# Patient Record
Sex: FEMALE | Race: WHITE | NOT HISPANIC OR LATINO | Employment: FULL TIME | URBAN - METROPOLITAN AREA
[De-identification: names, ages, dates, MRNs, and addresses within clinical notes are randomized per-mention and may not be internally consistent; named-entity substitution may affect disease eponyms.]

---

## 2017-10-06 ENCOUNTER — OFFICE VISIT (OUTPATIENT)
Dept: URGENT CARE | Facility: CLINIC | Age: 42
End: 2017-10-06
Payer: COMMERCIAL

## 2017-10-06 VITALS
DIASTOLIC BLOOD PRESSURE: 70 MMHG | TEMPERATURE: 99.5 F | WEIGHT: 182 LBS | HEIGHT: 63 IN | SYSTOLIC BLOOD PRESSURE: 118 MMHG | OXYGEN SATURATION: 100 % | BODY MASS INDEX: 32.25 KG/M2 | HEART RATE: 77 BPM

## 2017-10-06 DIAGNOSIS — L30.8 OTHER ECZEMA: ICD-10-CM

## 2017-10-06 DIAGNOSIS — J01.00 ACUTE NON-RECURRENT MAXILLARY SINUSITIS: ICD-10-CM

## 2017-10-06 PROCEDURE — 99203 OFFICE O/P NEW LOW 30 MIN: CPT | Performed by: PHYSICIAN ASSISTANT

## 2017-10-06 RX ORDER — AMOXICILLIN AND CLAVULANATE POTASSIUM 875; 125 MG/1; MG/1
1 TABLET, FILM COATED ORAL 2 TIMES DAILY
Qty: 14 TAB | Refills: 0 | Status: SHIPPED | OUTPATIENT
Start: 2017-10-06 | End: 2017-10-13

## 2017-10-06 NOTE — LETTER
October 6, 2017         Patient: Cindy Yung   YOB: 1975   Date of Visit: 10/6/2017           To Whom it May Concern:    Cindy Yung was seen in my clinic on 10/6/2017. Please excuse her absence today, 10/6/17.    If you have any questions or concerns, please don't hesitate to call.        Sincerely,           Opal Pollard P.A.-C.  Electronically Signed

## 2017-10-07 ENCOUNTER — TELEPHONE (OUTPATIENT)
Dept: URGENT CARE | Facility: CLINIC | Age: 42
End: 2017-10-07

## 2017-10-07 DIAGNOSIS — L30.9 ECZEMA, UNSPECIFIED TYPE: ICD-10-CM

## 2017-10-07 RX ORDER — PIMECROLIMUS 10 MG/G
1 CREAM TOPICAL 2 TIMES DAILY
Qty: 30 G | Refills: 1 | Status: SHIPPED | OUTPATIENT
Start: 2017-10-07 | End: 2021-07-26

## 2017-10-07 NOTE — TELEPHONE ENCOUNTER
Pt called; seen here for eczema rash yest; didn't get med at pharmacy; wants a non steroid cream; will send elidel rx; [Urg Care Provider not here today and pt doesn't want to wait on med]. rw

## 2017-10-07 NOTE — PROGRESS NOTES
Subjective:      Cindy Yung is a 42 y.o. female who presents with Rash (X 3 weeks, all over parts of body, hx eczyma ) and Sinus Problem (X 1 day, headache, Ear pressure, lathargic )        Patient is accompanied by her .     Rash   This is a recurrent problem. The current episode started 1 to 4 weeks ago (3 weeks). The problem has been gradually worsening since onset. The affected locations include the chest, left arm and right arm. The rash is characterized by dryness, itchiness and redness. She was exposed to nothing. Pertinent negatives include no cough, diarrhea, fever, joint pain, nail changes, shortness of breath, sore throat or vomiting. Past treatments include topical steroids (OTC hydrocortisone). The treatment provided no relief. Her past medical history is significant for eczema. There is no history of allergies or asthma.   Sinus Problem   Pertinent negatives include no chills, coughing, ear pain, shortness of breath or sore throat.     Patient presents to urgent care reporting sinus pressure and congestion with ear pressure and fatigue x 1 week. She reports a history of infrequent sinus infections. She also reports a flare of eczema x 3 weeks that she believes is due to increased stress. She states her flares are usually manageable with OTC hydrocortisone cream, but this episode is not responding well.     Review of Systems   Constitutional: Negative for chills and fever.   HENT: Negative for ear pain and sore throat.         Positive for sinus pressure   Respiratory: Negative for cough, sputum production and shortness of breath.    Cardiovascular: Negative for chest pain.   Gastrointestinal: Negative for abdominal pain, diarrhea, nausea and vomiting.   Genitourinary: Negative.    Musculoskeletal: Negative.  Negative for joint pain.   Skin: Positive for itching and rash. Negative for nail changes.   Neurological: Negative for dizziness.      Objective:     /70   Pulse 77   Temp 37.5  "°C (99.5 °F)   Ht 1.6 m (5' 3\")   Wt 82.6 kg (182 lb)   SpO2 100%   BMI 32.24 kg/m²      Physical Exam   Constitutional: She is oriented to person, place, and time. She appears well-developed and well-nourished. No distress.   HENT:   Head: Normocephalic and atraumatic.   Right Ear: Hearing, tympanic membrane, external ear and ear canal normal.   Left Ear: Hearing, tympanic membrane, external ear and ear canal normal.   Nose: Mucosal edema present. Right sinus exhibits maxillary sinus tenderness. Left sinus exhibits maxillary sinus tenderness.   Mouth/Throat: Oropharynx is clear and moist. No oropharyngeal exudate.   Eyes: Conjunctivae are normal. Pupils are equal, round, and reactive to light. Right eye exhibits no discharge. Left eye exhibits no discharge.   Neck: Normal range of motion.   Cardiovascular: Normal rate, regular rhythm and normal heart sounds.    No murmur heard.  Pulmonary/Chest: Effort normal and breath sounds normal. No respiratory distress. She has no wheezes. She has no rales.   Musculoskeletal: Normal range of motion.   Lymphadenopathy:     She has no cervical adenopathy.   Neurological: She is alert and oriented to person, place, and time.   Skin: Skin is warm and dry. She is not diaphoretic.   Diffuse raised and erythematous areas on chest and bilateral forearms   Psychiatric: She has a normal mood and affect. Her behavior is normal.   Nursing note and vitals reviewed.       PMH:  has a past medical history of Depression.  MEDS:   Current Outpatient Prescriptions:   •  triamcinolone acetonide (KENALOG) 0.1 % Cream, Apply thin film to affected areas twice daily until symptoms resolve, Disp: 1 Tube, Rfl: 0  •  Hydrocortisone-Aloe Vera (CORTIZONE-10/ALOE) 1 % Cream, by Apply externally route., Disp: , Rfl:   •  amoxicillin-clavulanate (AUGMENTIN) 875-125 MG Tab, Take 1 Tab by mouth 2 times a day for 7 days., Disp: 14 Tab, Rfl: 0  •  pimecrolimus (ELIDEL) 1 % cream, Apply 1 Application to " affected area(s) 2 times a day., Disp: 30 g, Rfl: 1  •  omeprazole (PRILOSEC) 20 MG CPDR, Take 1 Cap by mouth every day., Disp: 30 Cap, Rfl: 11  •  buPROPion (WELLBUTRIN) 150 MG XL tablet, Take 1 Tab by mouth every morning., Disp: 30 Tab, Rfl: 0  •  fluoxetine (PROZAC) 10 MG CAPS, Take 1 Cap by mouth every day., Disp: 21 Each, Rfl: 0  •  alprazolam (XANAX) 0.5 MG TABS, Take 1 Tab by mouth 3 times a day as needed for Sleep and Anxiety., Disp: 30 Each, Rfl: 1  •  fluoxetine (PROZAC) 20 MG tablet, Take 1 Tab by mouth every day., Disp: 30 Tab, Rfl: 3  •  zolpidem (AMBIEN) 10 MG TABS, Take 1 Tab by mouth at bedtime as needed for Sleep., Disp: 30 Each, Rfl: 1  •  ciprofloxacin (CIPRO) 500 MG TABS, Take 1 Tab by mouth 2 times a day., Disp: 20 Each, Rfl: 0  •  buPROPion (WELLBUTRIN) 300 MG XL tablet, Take 1 Tab by mouth every morning., Disp: 30 Tab, Rfl: 3  •  betamethasone valerate (VALISONE) 0.1 % CREA, Apply  to affected area(s) 2 times a day. Use for 10 days, then break for one week, Disp: 45 g, Rfl: 1  •  montelukast (SINGULAIR) 10 MG TABS, Take 1 Tab by mouth every day., Disp: 30 Each, Rfl: 11  •  mometasone (NASONEX) 50 MCG/ACT nasal spray, Spray 2 Act in nose every day. Each nostril, Disp: 1 Inhaler, Rfl: 6  ALLERGIES: Not on File  SURGHX:   Past Surgical History:   Procedure Laterality Date   • ABDOMINAL HYSTERECTOMY TOTAL     • TUMOR EXCISION WITH BIOPSY      stomach - benign     SOCHX:  reports that she has never smoked. She has never used smokeless tobacco. She reports that she drinks alcohol.  FH: family history includes Diabetes in her father; Hypertension in her father; Psychiatry in her sister; Thyroid in her sister.       Assessment/Plan:     1. Other eczema  Trimacinolone cream 0.1 % - apply thin film to affected area 2 times daily until resolved. Discussed applying topical unscented moisturizer 2 times daily and avoiding any possible triggers. The patient demonstrated a good understanding and agreed with  the treatment plan.    2. Acute non-recurrent maxillary sinusitis  - amoxicillin-clavulanate (AUGMENTIN) 875-125 MG Tab; Take 1 Tab by mouth 2 times a day for 7 days.  Dispense: 14 Tab; Refill: 0   - Complete full course of antibiotics as prescribed      Advised patient her symptoms are most likely viral in etiology, recommend supportive care. Increased fluids and rest. Discussed use of nedi-pot, humidifier, and Flonase nasal spray for symptomatic relief. Contingent abx given if symptoms fail to improve or resolve after 3-4 days of conservative therapy. The patient demonstrated a good understanding and agreed with the treatment plan.

## 2017-10-09 RX ORDER — TRIAMCINOLONE ACETONIDE 1 MG/G
CREAM TOPICAL
Qty: 1 TUBE | Refills: 0 | Status: SHIPPED | OUTPATIENT
Start: 2017-10-09 | End: 2021-07-26

## 2017-10-09 ASSESSMENT — ENCOUNTER SYMPTOMS
DIARRHEA: 0
SORE THROAT: 0
MUSCULOSKELETAL NEGATIVE: 1
COUGH: 0
ABDOMINAL PAIN: 0
CHILLS: 0
SPUTUM PRODUCTION: 0
VOMITING: 0
FEVER: 0
SHORTNESS OF BREATH: 0
NAIL CHANGES: 0
NAUSEA: 0
DIZZINESS: 0

## 2017-12-20 ENCOUNTER — OFFICE VISIT (OUTPATIENT)
Dept: URGENT CARE | Facility: CLINIC | Age: 42
End: 2017-12-20
Payer: COMMERCIAL

## 2017-12-20 VITALS
TEMPERATURE: 98.4 F | DIASTOLIC BLOOD PRESSURE: 82 MMHG | HEART RATE: 72 BPM | OXYGEN SATURATION: 98 % | HEIGHT: 63 IN | SYSTOLIC BLOOD PRESSURE: 122 MMHG | BODY MASS INDEX: 29.77 KG/M2 | WEIGHT: 168 LBS | RESPIRATION RATE: 18 BRPM

## 2017-12-20 DIAGNOSIS — L30.9 ECZEMA, UNSPECIFIED TYPE: ICD-10-CM

## 2017-12-20 PROCEDURE — 99213 OFFICE O/P EST LOW 20 MIN: CPT | Performed by: PHYSICIAN ASSISTANT

## 2017-12-20 RX ORDER — DEXAMETHASONE SODIUM PHOSPHATE 4 MG/ML
8 INJECTION, SOLUTION INTRA-ARTICULAR; INTRALESIONAL; INTRAMUSCULAR; INTRAVENOUS; SOFT TISSUE ONCE
Status: COMPLETED | OUTPATIENT
Start: 2017-12-20 | End: 2017-12-20

## 2017-12-20 RX ORDER — METHYLPREDNISOLONE 4 MG/1
TABLET ORAL
Qty: 1 TAB | Refills: 4 | Status: SHIPPED | OUTPATIENT
Start: 2017-12-20 | End: 2021-07-26

## 2017-12-20 RX ADMIN — DEXAMETHASONE SODIUM PHOSPHATE 8 MG: 4 INJECTION, SOLUTION INTRA-ARTICULAR; INTRALESIONAL; INTRAMUSCULAR; INTRAVENOUS; SOFT TISSUE at 15:04

## 2017-12-20 ASSESSMENT — ENCOUNTER SYMPTOMS
NEUROLOGICAL NEGATIVE: 1
CONSTITUTIONAL NEGATIVE: 1
MUSCULOSKELETAL NEGATIVE: 1

## 2017-12-20 NOTE — LETTER
December 20, 2017         Patient: Cindy Yung   YOB: 1975   Date of Visit: 12/20/2017           To Whom it May Concern:    Cindy Yung was seen in my clinic on 12/20/2017; please excuse work for today.    If you have any questions or concerns, please don't hesitate to call.        Sincerely,           Shar Goodrich P.A.-C.  Electronically Signed

## 2017-12-29 ENCOUNTER — OFFICE VISIT (OUTPATIENT)
Dept: URGENT CARE | Facility: CLINIC | Age: 42
End: 2017-12-29
Payer: COMMERCIAL

## 2017-12-29 VITALS
HEIGHT: 63 IN | DIASTOLIC BLOOD PRESSURE: 84 MMHG | OXYGEN SATURATION: 97 % | TEMPERATURE: 97.9 F | HEART RATE: 80 BPM | BODY MASS INDEX: 29.06 KG/M2 | WEIGHT: 164 LBS | SYSTOLIC BLOOD PRESSURE: 120 MMHG

## 2017-12-29 DIAGNOSIS — L30.9 ECZEMA, UNSPECIFIED TYPE: ICD-10-CM

## 2017-12-29 PROCEDURE — 99214 OFFICE O/P EST MOD 30 MIN: CPT | Performed by: NURSE PRACTITIONER

## 2017-12-29 RX ORDER — CEPHALEXIN 500 MG/1
500 CAPSULE ORAL 2 TIMES DAILY
Qty: 14 CAP | Refills: 0 | Status: SHIPPED | OUTPATIENT
Start: 2017-12-29 | End: 2019-04-09

## 2017-12-29 RX ORDER — PREDNISONE 20 MG/1
TABLET ORAL
Qty: 30 TAB | Refills: 0 | Status: SHIPPED | OUTPATIENT
Start: 2017-12-29 | End: 2019-05-31

## 2017-12-29 ASSESSMENT — ENCOUNTER SYMPTOMS
CHILLS: 0
HEADACHES: 0
WEIGHT LOSS: 1
FEVER: 0
SORE THROAT: 1

## 2017-12-30 NOTE — PROGRESS NOTES
Subjective:      Cindy Yung is a 42 y.o. female who presents with Eczema (on hands,chest,stomach,back hurts/burns xlast night )            HPI New problem. 42 year old female presenting with rash to torso, arms and hands since last night. This has been an ongoing issue and she was last seen on 12/20, rx'd cream and medrol as well as decadron shot. She was doing well until last night when rash once again flared. It is red, itching/burning and blotchy. No new exposures. She uses all unscented products. Taking benadryl at night.  Patient has no known allergies.  Current Outpatient Prescriptions on File Prior to Visit   Medication Sig Dispense Refill   • MethylPREDNISolone (MEDROL DOSEPAK) 4 MG Tablet Therapy Pack U.D. 1 Tab 4   • triamcinolone acetonide (KENALOG) 0.1 % Cream Apply thin film to affected areas twice daily until symptoms resolve 1 Tube 0   • pimecrolimus (ELIDEL) 1 % cream Apply 1 Application to affected area(s) 2 times a day. 30 g 1   • Hydrocortisone-Aloe Vera (CORTIZONE-10/ALOE) 1 % Cream by Apply externally route.     • omeprazole (PRILOSEC) 20 MG CPDR Take 1 Cap by mouth every day. 30 Cap 11   • buPROPion (WELLBUTRIN) 150 MG XL tablet Take 1 Tab by mouth every morning. 30 Tab 0   • fluoxetine (PROZAC) 10 MG CAPS Take 1 Cap by mouth every day. 21 Each 0   • alprazolam (XANAX) 0.5 MG TABS Take 1 Tab by mouth 3 times a day as needed for Sleep and Anxiety. 30 Each 1   • fluoxetine (PROZAC) 20 MG tablet Take 1 Tab by mouth every day. 30 Tab 3   • zolpidem (AMBIEN) 10 MG TABS Take 1 Tab by mouth at bedtime as needed for Sleep. 30 Each 1   • ciprofloxacin (CIPRO) 500 MG TABS Take 1 Tab by mouth 2 times a day. 20 Each 0   • buPROPion (WELLBUTRIN) 300 MG XL tablet Take 1 Tab by mouth every morning. 30 Tab 3   • betamethasone valerate (VALISONE) 0.1 % CREA Apply  to affected area(s) 2 times a day. Use for 10 days, then break for one week 45 g 1   • montelukast (SINGULAIR) 10 MG TABS Take 1 Tab by mouth  "every day. 30 Each 11   • mometasone (NASONEX) 50 MCG/ACT nasal spray Spray 2 Act in nose every day. Each nostril 1 Inhaler 6     No current facility-administered medications on file prior to visit.      Social History     Social History   • Marital status:      Spouse name: N/A   • Number of children: N/A   • Years of education: N/A     Occupational History   • Not on file.     Social History Main Topics   • Smoking status: Never Smoker   • Smokeless tobacco: Never Used   • Alcohol use Yes      Comment: mod   • Drug use: Unknown   • Sexual activity: Not on file      Comment: ; three kids; wk: exec assist @ [employer]     Other Topics Concern   • Not on file     Social History Narrative   • No narrative on file     family history includes Diabetes in her father; Hypertension in her father; Psychiatry in her sister; Thyroid in her sister.      Review of Systems   Constitutional: Positive for weight loss. Negative for chills and fever.   HENT: Positive for sore throat.    Skin: Positive for itching and rash.   Neurological: Negative for headaches.          Objective:     /84   Pulse 80   Temp 36.6 °C (97.9 °F)   Ht 1.6 m (5' 3\")   Wt 74.4 kg (164 lb)   SpO2 97%   BMI 29.05 kg/m²      Physical Exam   Constitutional: She is oriented to person, place, and time. She appears well-developed and well-nourished. No distress.   Cardiovascular: Normal rate, regular rhythm and normal heart sounds.    No murmur heard.  Pulmonary/Chest: Effort normal and breath sounds normal.   Musculoskeletal: Normal range of motion.   Neurological: She is alert and oriented to person, place, and time.   Skin: Skin is warm and dry. Rash noted.   Mostly confluent red rash over arms and torso, raised. No discharge or excoriation noted on exam.   Psychiatric: She has a normal mood and affect. Her behavior is normal. Thought content normal.   Nursing note and vitals reviewed.              Assessment/Plan:     1. Eczema, " unspecified type    - fluocinonide (LIDEX) 0.05 % Cream; Apply to affected areas twice daily for 5 days, take 2 days and then repeat.  Dispense: 1 Tube; Refill: 1  - predniSONE (DELTASONE) 20 MG Tab; Take 1 tab daily for 3 days.  Dispense: 30 Tab; Refill: 0  - cephALEXin (KEFLEX) 500 MG Cap; Take 1 Cap by mouth 2 times a day.  Dispense: 14 Cap; Refill: 0  - REFERRAL TO ALLERGY    Reiterated importance of moisturizing.  Allergy referral  She has derm referral in place.  Differential diagnosis, natural history, supportive care, and indications for immediate follow-up discussed at length.

## 2018-01-11 ENCOUNTER — PATIENT MESSAGE (OUTPATIENT)
Dept: URGENT CARE | Facility: CLINIC | Age: 43
End: 2018-01-11

## 2019-03-26 ENCOUNTER — OFFICE VISIT (OUTPATIENT)
Dept: URGENT CARE | Facility: CLINIC | Age: 44
End: 2019-03-26
Payer: COMMERCIAL

## 2019-03-26 ENCOUNTER — TELEPHONE (OUTPATIENT)
Dept: SCHEDULING | Facility: IMAGING CENTER | Age: 44
End: 2019-03-26

## 2019-03-26 VITALS
DIASTOLIC BLOOD PRESSURE: 60 MMHG | HEIGHT: 63 IN | OXYGEN SATURATION: 98 % | SYSTOLIC BLOOD PRESSURE: 130 MMHG | TEMPERATURE: 97.9 F | BODY MASS INDEX: 26.05 KG/M2 | RESPIRATION RATE: 16 BRPM | HEART RATE: 60 BPM | WEIGHT: 147 LBS

## 2019-03-26 DIAGNOSIS — F32.A ANXIETY AND DEPRESSION: ICD-10-CM

## 2019-03-26 DIAGNOSIS — F41.9 ANXIETY AND DEPRESSION: ICD-10-CM

## 2019-03-26 PROCEDURE — 99214 OFFICE O/P EST MOD 30 MIN: CPT | Performed by: PHYSICIAN ASSISTANT

## 2019-03-26 ASSESSMENT — ENCOUNTER SYMPTOMS
FATIGUE: 0
GASTROINTESTINAL NEGATIVE: 1
ROS GI COMMENTS: ANOREXIA
CONSTITUTIONAL NEGATIVE: 1
NERVOUS/ANXIOUS: 0
INSOMNIA: 0
RESPIRATORY NEGATIVE: 1
HEADACHES: 0
DEPRESSION: 1
CARDIOVASCULAR NEGATIVE: 1
NEUROLOGICAL NEGATIVE: 1
CHILLS: 0
EYES NEGATIVE: 1
FEVER: 0

## 2019-03-26 ASSESSMENT — LIFESTYLE VARIABLES: SUBSTANCE_ABUSE: 0

## 2019-03-26 NOTE — PROGRESS NOTES
Subjective:      Cindy Yung is a 43 y.o. female who presents with Other ([id number], emotionally not well)            Patient is here because she has been having a recurrence of her anxiety and depression for the last month.  She states she has not been emotionally coping well.  She has a long history of this problem and there is also a strong family history.  She was on medication for several years however has not been on meds for the last 3 years.  She did not have insurance for a long time and has not been to a mental health specialist in over 2 years.  She does have a strong support system at home with her  and at her local [facility].  They do offer counseling services for free.  She is here because she is requesting referral to specialty health for medication and counseling services.  She denies any drug or alcohol use or abuse.  She does admit to thoughts of harming herself however none recently and she has no plan associated with this.  She has no homicidal ideation.  Patient with decreased appetite.  Sleeping well.      Depression   This is a chronic problem. The current episode started more than 1 month ago. The problem occurs constantly. The problem has been waxing and waning. Pertinent negatives include no chest pain, chills, congestion, fatigue, fever or headaches. She has tried nothing for the symptoms. The treatment provided no relief.       PMH:  has a past medical history of Depression.  MEDS:   Current Outpatient Prescriptions:   •  betamethasone valerate (VALISONE) 0.1 % CREA, Apply  to affected area(s) 2 times a day. Use for 10 days, then break for one week, Disp: 45 g, Rfl: 1  •  fluocinonide (LIDEX) 0.05 % Cream, Apply to affected areas twice daily for 5 days, take 2 days and then repeat. (Patient not taking: Reported on 3/26/2019), Disp: 1 Tube, Rfl: 1  •  predniSONE (DELTASONE) 20 MG Tab, Take 1 tab daily for 3 days. (Patient not taking: Reported on 3/26/2019), Disp: 30 Tab, Rfl: 0  •   cephALEXin (KEFLEX) 500 MG Cap, Take 1 Cap by mouth 2 times a day. (Patient not taking: Reported on 3/26/2019), Disp: 14 Cap, Rfl: 0  •  MethylPREDNISolone (MEDROL DOSEPAK) 4 MG Tablet Therapy Pack, U.D. (Patient not taking: Reported on 3/26/2019), Disp: 1 Tab, Rfl: 4  •  triamcinolone acetonide (KENALOG) 0.1 % Cream, Apply thin film to affected areas twice daily until symptoms resolve (Patient not taking: Reported on 3/26/2019), Disp: 1 Tube, Rfl: 0  •  pimecrolimus (ELIDEL) 1 % cream, Apply 1 Application to affected area(s) 2 times a day. (Patient not taking: Reported on 3/26/2019), Disp: 30 g, Rfl: 1  •  Hydrocortisone-Aloe Vera (CORTIZONE-10/ALOE) 1 % Cream, by Apply externally route., Disp: , Rfl:   •  omeprazole (PRILOSEC) 20 MG CPDR, Take 1 Cap by mouth every day. (Patient not taking: Reported on 3/26/2019), Disp: 30 Cap, Rfl: 11  •  buPROPion (WELLBUTRIN) 150 MG XL tablet, Take 1 Tab by mouth every morning. (Patient not taking: Reported on 3/26/2019), Disp: 30 Tab, Rfl: 0  •  fluoxetine (PROZAC) 10 MG CAPS, Take 1 Cap by mouth every day. (Patient not taking: Reported on 3/26/2019), Disp: 21 Each, Rfl: 0  •  alprazolam (XANAX) 0.5 MG TABS, Take 1 Tab by mouth 3 times a day as needed for Sleep and Anxiety. (Patient not taking: Reported on 3/26/2019), Disp: 30 Each, Rfl: 1  •  fluoxetine (PROZAC) 20 MG tablet, Take 1 Tab by mouth every day. (Patient not taking: Reported on 3/26/2019), Disp: 30 Tab, Rfl: 3  •  zolpidem (AMBIEN) 10 MG TABS, Take 1 Tab by mouth at bedtime as needed for Sleep. (Patient not taking: Reported on 3/26/2019), Disp: 30 Each, Rfl: 1  •  ciprofloxacin (CIPRO) 500 MG TABS, Take 1 Tab by mouth 2 times a day. (Patient not taking: Reported on 3/26/2019), Disp: 20 Each, Rfl: 0  •  buPROPion (WELLBUTRIN) 300 MG XL tablet, Take 1 Tab by mouth every morning. (Patient not taking: Reported on 3/26/2019), Disp: 30 Tab, Rfl: 3  •  montelukast (SINGULAIR) 10 MG TABS, Take 1 Tab by mouth every day.  "(Patient not taking: Reported on 3/26/2019), Disp: 30 Each, Rfl: 11  •  mometasone (NASONEX) 50 MCG/ACT nasal spray, Spray 2 Act in nose every day. Each nostril (Patient not taking: Reported on 3/26/2019), Disp: 1 Inhaler, Rfl: 6  ALLERGIES: No Known Allergies  SURGHX:   Past Surgical History:   Procedure Laterality Date   • ABDOMINAL HYSTERECTOMY TOTAL     • TUMOR EXCISION WITH BIOPSY      stomach - benign     SOCHX:  reports that she has never smoked. She has never used smokeless tobacco. She reports that she drinks alcohol.  FH: family history includes Diabetes in her father; Hypertension in her father; Psychiatry in her sister; Thyroid in her sister.      Review of Systems   Constitutional: Negative.  Negative for chills, fatigue and fever.   HENT: Negative for congestion.    Eyes: Negative.    Respiratory: Negative.    Cardiovascular: Negative.  Negative for chest pain.   Gastrointestinal: Negative.         Anorexia   Neurological: Negative.  Negative for headaches.   Psychiatric/Behavioral: Positive for depression. Negative for substance abuse and suicidal ideas. The patient is not nervous/anxious and does not have insomnia.        Medications, Allergies, and current problem list reviewed today in Epic     Objective:     /60 (BP Location: Left arm, Patient Position: Sitting, BP Cuff Size: Adult)   Pulse 60   Temp 36.6 °C (97.9 °F) (Temporal)   Resp 16   Ht 1.6 m (5' 3\")   Wt 66.7 kg (147 lb)   SpO2 98%   BMI 26.04 kg/m²      Physical Exam   Constitutional: She is oriented to person, place, and time. She appears well-developed and well-nourished. No distress.   HENT:   Head: Normocephalic and atraumatic.   Right Ear: External ear normal.   Left Ear: External ear normal.   Eyes: Conjunctivae are normal.   Neck: Normal range of motion. Neck supple.   Cardiovascular: Normal rate, regular rhythm and normal heart sounds.    Pulmonary/Chest: Effort normal and breath sounds normal. No respiratory " distress. She has no wheezes. She has no rales.   Neurological: She is alert and oriented to person, place, and time.   Skin: Skin is warm and dry. She is not diaphoretic.   Psychiatric: Her speech is normal and behavior is normal. Judgment and thought content normal. Cognition and memory are normal. She exhibits a depressed mood.   Nursing note and vitals reviewed.              Assessment/Plan:     1. Anxiety and depression  REFERRAL TO PSYCHIATRY    REFERRAL TO PSYCHOLOGY    REFERRAL TO FAMILY PRACTICE     Patient here seeking help with her anxiety/depression.  This is been a lifelong struggle with a strong family history.  She has not seek medical care or medication for this in several years.  She does admit to self harming thoughts however she has no plan.  She denies any homicidal ideation.  She does not appear to be a harm to herself or others and she has a strong support system at home.  There is no substance or alcohol abuse.  I have placed stat referral to psychology, psychiatry and family medicine.  Because she has been off meds for so many years at do not feel comfortable starting the urgent care with no immediate follow-up scheduled.  She is given strict ER/return to clinic instructions.    Please note that this dictation was created using voice recognition software. I have made every reasonable attempt to correct obvious errors, but I expect that there are errors of grammar and possibly content that I did not discover before finalizing the note.

## 2019-03-26 NOTE — LETTER
March 26, 2019         Patient: Cindy Yung   YOB: 1975   Date of Visit: 3/26/2019           To Whom it May Concern:    Cindy Yung was seen in my clinic on 3/26/2019. Please excuse any absences this week.    If you have any questions or concerns, please don't hesitate to call.        Sincerely,           Gonzales Poole P.A.-C.  Electronically Signed

## 2019-04-09 ENCOUNTER — OFFICE VISIT (OUTPATIENT)
Dept: MEDICAL GROUP | Facility: MEDICAL CENTER | Age: 44
End: 2019-04-09
Payer: COMMERCIAL

## 2019-04-09 VITALS
HEART RATE: 74 BPM | HEIGHT: 63 IN | DIASTOLIC BLOOD PRESSURE: 80 MMHG | BODY MASS INDEX: 25.86 KG/M2 | RESPIRATION RATE: 18 BRPM | SYSTOLIC BLOOD PRESSURE: 130 MMHG | OXYGEN SATURATION: 97 % | TEMPERATURE: 98.7 F | WEIGHT: 145.94 LBS

## 2019-04-09 DIAGNOSIS — F41.9 ANXIETY: ICD-10-CM

## 2019-04-09 DIAGNOSIS — F32.A DEPRESSION, UNSPECIFIED DEPRESSION TYPE: ICD-10-CM

## 2019-04-09 DIAGNOSIS — Z12.39 SCREENING FOR MALIGNANT NEOPLASM OF BREAST: ICD-10-CM

## 2019-04-09 DIAGNOSIS — Z00.00 PREVENTATIVE HEALTH CARE: ICD-10-CM

## 2019-04-09 DIAGNOSIS — F44.81 DISSOCIATIVE IDENTITY DISORDER (HCC): ICD-10-CM

## 2019-04-09 DIAGNOSIS — Z91.51 HISTORY OF ATTEMPTED SUICIDE: ICD-10-CM

## 2019-04-09 DIAGNOSIS — F60.3 BORDERLINE PERSONALITY DISORDER (HCC): ICD-10-CM

## 2019-04-09 PROBLEM — L30.9 ECZEMA: Status: ACTIVE | Noted: 2019-04-09

## 2019-04-09 PROCEDURE — 99214 OFFICE O/P EST MOD 30 MIN: CPT | Performed by: PHYSICIAN ASSISTANT

## 2019-04-09 RX ORDER — BUPROPION HYDROCHLORIDE 150 MG/1
150 TABLET ORAL EVERY MORNING
Qty: 30 TAB | Refills: 2 | Status: SHIPPED | OUTPATIENT
Start: 2019-04-09 | End: 2019-05-31

## 2019-04-09 RX ORDER — ALPRAZOLAM 0.5 MG/1
0.5 TABLET ORAL
Qty: 20 TAB | Refills: 0 | Status: SHIPPED
Start: 2019-04-09 | End: 2019-05-09
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
## 2019-04-09 NOTE — LETTER
April 9, 2019         Patient: Cindy Yung   YOB: 1975   Date of Visit: 4/9/2019           To Whom it May Concern:    Cindy Yung was seen in my clinic on 4/9/2019. She may return to work on 4/10/19.    If you have any questions or concerns, please don't hesitate to call.        Sincerely,           Zaida Leonardo P.A.-C.  Electronically Signed

## 2019-05-03 ENCOUNTER — OFFICE VISIT (OUTPATIENT)
Dept: MEDICAL GROUP | Facility: MEDICAL CENTER | Age: 44
End: 2019-05-03
Payer: COMMERCIAL

## 2019-05-03 VITALS
BODY MASS INDEX: 25.39 KG/M2 | HEART RATE: 78 BPM | RESPIRATION RATE: 18 BRPM | WEIGHT: 143.3 LBS | OXYGEN SATURATION: 98 % | HEIGHT: 63 IN | DIASTOLIC BLOOD PRESSURE: 80 MMHG | TEMPERATURE: 97.9 F | SYSTOLIC BLOOD PRESSURE: 120 MMHG

## 2019-05-03 DIAGNOSIS — F32.A DEPRESSION, UNSPECIFIED DEPRESSION TYPE: ICD-10-CM

## 2019-05-03 DIAGNOSIS — F41.9 ANXIETY: ICD-10-CM

## 2019-05-03 PROCEDURE — 99214 OFFICE O/P EST MOD 30 MIN: CPT | Performed by: PHYSICIAN ASSISTANT

## 2019-05-03 RX ORDER — ALPRAZOLAM 0.25 MG/1
0.25 TABLET ORAL NIGHTLY PRN
Qty: 30 TAB | Refills: 0 | Status: SHIPPED
Start: 2019-05-03 | End: 2019-06-02

## 2019-05-03 NOTE — LETTER
May 3, 2019         Patient: Cindy Yung   YOB: 1975   Date of Visit: 5/3/2019           To Whom it May Concern:    Cindy Yung was seen in my clinic on 5/3/2019. She may return to work on 5/3/2019..    If you have any questions or concerns, please don't hesitate to call.        Sincerely,           Zaida Leonardo P.A.-C.  Electronically Signed

## 2019-05-03 NOTE — PROGRESS NOTES
"Chief Complaint   Patient presents with   • Depression     follow up       HPI  Cindy Yung is a 44 y.o. female here today for follow-up on depression and anxiety.  Patient has extensive history of PTSD, depression and anxiety.  At the time when I saw her she is doing well.  Today in office he states she has been doing a lot better.  She never started Wellbutrin because her  who keeps notes on her condition told her that she did not do well on Wellbutrin in the past.  In fact the Wellbutrin made her suicidal.  Patient has tried Xanax 0.5 mg PRN.  States it actually helped a lot with her anxiety however it was a low strong for her and she would rather have half the dose.  Patient is going through a difficult time now with her son getting  and having problems with custody over his 20 months old daughter.  States even though the problem is ongoing she feels a lot better, her mood is better, no suicidal or homicidal ideations.  She has been trying natural supplements such as some eat.      Past medical, surgical, family, and social history is reviewed in Epic chart by me today.   Medications and allergies reviewed and updated in Epic chart by me today.     ROS:   As documented in history of present illness above    Exam:  /80 (Patient Position: Sitting)   Pulse 78   Temp 36.6 °C (97.9 °F) (Temporal)   Resp 18   Ht 1.6 m (5' 3\")   Wt 65 kg (143 lb 4.8 oz)   SpO2 98%   Constitutional: Alert, no distress, plus 3 vital signs  Skin:  Warm, dry, no rashes invisible areas  Eye: Equal, round and reactive, conjunctiva clear    Psych: Alert, pleasant, well-groomed, normal affect    A/P:  1. Anxiety  Changing Xanax dose from 0.5 --> 0.25  - ALPRAZolam (XANAX) 0.25 MG Tab; Take 1 Tab by mouth at bedtime as needed for Sleep for up to 30 days.  Dispense: 30 Tab; Refill: 0    2. Depression, unspecified depression type  Patient has appointment made in September with the behavioral health.  Patient is a " stable without any homicidal suicidal ideations.  States her mood is a lot better.  States he could be due to the weather and having more carmencita days she is not sure.  Overall doing better.    F/u prn

## 2019-05-03 NOTE — LETTER
May 3, 2019         Patient: Cindy Yung   YOB: 1975   Date of Visit: 5/3/2019           To Whom it May Concern:    Cindy Yung was seen in my clinic on 5/3/2019 for depression. She might benefit from MTHF supplement.      If you have any questions or concerns, please don't hesitate to call.        Sincerely,           Zaida Leonardo P.A.-C.  Electronically Signed

## 2019-05-31 ENCOUNTER — OFFICE VISIT (OUTPATIENT)
Dept: MEDICAL GROUP | Facility: MEDICAL CENTER | Age: 44
End: 2019-05-31
Payer: COMMERCIAL

## 2019-05-31 VITALS
OXYGEN SATURATION: 97 % | HEIGHT: 63 IN | WEIGHT: 142.2 LBS | SYSTOLIC BLOOD PRESSURE: 122 MMHG | HEART RATE: 76 BPM | RESPIRATION RATE: 18 BRPM | BODY MASS INDEX: 25.2 KG/M2 | TEMPERATURE: 99.7 F | DIASTOLIC BLOOD PRESSURE: 80 MMHG

## 2019-05-31 DIAGNOSIS — T78.40XA ALLERGIC STATE, INITIAL ENCOUNTER: ICD-10-CM

## 2019-05-31 PROCEDURE — 99214 OFFICE O/P EST MOD 30 MIN: CPT | Performed by: PHYSICIAN ASSISTANT

## 2019-05-31 RX ORDER — MONTELUKAST SODIUM 10 MG/1
10 TABLET ORAL DAILY
Qty: 90 TAB | Refills: 0 | Status: SHIPPED | OUTPATIENT
Start: 2019-05-31 | End: 2019-09-04 | Stop reason: SDUPTHER

## 2019-05-31 RX ORDER — TRIAMCINOLONE ACETONIDE 40 MG/ML
60 INJECTION, SUSPENSION INTRA-ARTICULAR; INTRAMUSCULAR ONCE
Status: COMPLETED | OUTPATIENT
Start: 2019-05-31 | End: 2019-05-31

## 2019-05-31 RX ORDER — ALBUTEROL SULFATE 90 UG/1
2 AEROSOL, METERED RESPIRATORY (INHALATION) EVERY 6 HOURS PRN
Qty: 8.5 G | Refills: 1 | Status: SHIPPED | OUTPATIENT
Start: 2019-05-31 | End: 2020-04-02 | Stop reason: SDUPTHER

## 2019-05-31 RX ADMIN — TRIAMCINOLONE ACETONIDE 60 MG: 40 INJECTION, SUSPENSION INTRA-ARTICULAR; INTRAMUSCULAR at 10:56

## 2019-05-31 NOTE — LETTER
May 31, 2019         Patient: Cindy Yung   YOB: 1975   Date of Visit: 5/31/2019           To Whom it May Concern:    Cindy Yung was seen in my clinic on 5/31/2019. She may return to work on 5/31/19.    If you have any questions or concerns, please don't hesitate to call.        Sincerely,           Zaida Leonardo P.A.-C.  Electronically Signed

## 2019-05-31 NOTE — PROGRESS NOTES
"Chief Complaint   Patient presents with   • Cold Exposure     possibly allergies       HPI  Cindy Yung is a 44 y.o. female here today for cold-like symptoms like hoarse voice, PND and a that has been going on on and off for couple months now.  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  Cough is with clear mucus.  She has tried Mucinex and over-the-counter allergy medications without much help.  Denies any headache or sore throat or otalgia.  No S OB.  No fevers or chills.  Positive sinus pressure.      Past medical, surgical, family, and social history is reviewed in Epic chart by me today.   Medications and allergies reviewed and updated in Epic chart by me today.     ROS:   As documented in history of present illness above    Exam:  /80 (Patient Position: Sitting)   Pulse 76   Temp 37.6 °C (99.7 °F) (Temporal)   Resp 18   Ht 1.6 m (5' 3\")   Wt 64.5 kg (142 lb 3.2 oz)   SpO2 97%   Constitutional: Alert, no distress, plus 3 vital signs  Skin:  Warm, dry, no rashes invisible areas  Eye: Equal, round and reactive, conjunctiva clear  ENMT: Lips without lesions, good dentition, oropharynx clear    Neck: Trachea midline, no masses, no thyromegaly  Respiratory: Unlabored respiratory effort, lungs clear to auscultation, no wheezes, no rhonchi  Cardiovascular: RRR, no murmur, no lower extremities edema,  Psych: Alert, pleasant, well-groomed, normal affect    A/P:  1. Allergic state, initial encounter    - albuterol 108 (90 Base) MCG/ACT Aero Soln inhalation aerosol; Inhale 2 Puffs by mouth every 6 hours as needed for Shortness of Breath.  Dispense: 8.5 g; Refill: 1  - triamcinolone acetonide (KENALOG-40) injection 60 mg; 1.5 mL by Intramuscular route Once.  - montelukast (SINGULAIR) 10 MG Tab; Take 1 Tab by mouth every day.  Dispense: 90 Tab; Refill: 0    F/u prn     "

## 2019-06-07 NOTE — PROGRESS NOTES
"Subjective:      Cindy Yung is a 42 y.o. female who presents with Rash (x 2 months, itchy rash all over  \"was diagnose with eczema\")            Rash   This is a chronic (itchy rash, eczema hx) problem. The problem is unchanged. The rash is diffuse. The rash is characterized by redness and itchiness. It is unknown if there was an exposure to a precipitant. Pertinent negatives include no facial edema or joint pain. Past treatments include nothing. The treatment provided mild relief. Her past medical history is significant for eczema.       Review of Systems   Constitutional: Negative.    Musculoskeletal: Negative.  Negative for joint pain.   Skin: Positive for rash.   Neurological: Negative.           Objective:     Resp 18   Wt 76.2 kg (168 lb)   BMI 29.76 kg/m²      Physical Exam   Constitutional: She is oriented to person, place, and time. She appears well-developed. No distress.   Musculoskeletal: She exhibits no edema or tenderness.   Neurological: She is alert and oriented to person, place, and time.   Skin: Skin is warm and dry. Rash noted.   Psychiatric: She has a normal mood and affect. Her behavior is normal. Judgment and thought content normal.   Nursing note and vitals reviewed.    Vitals:    12/20/17 1443   BP: 122/82   Pulse: 72   Resp: 18   Temp: 36.9 °C (98.4 °F)   SpO2: 98%   Weight: 76.2 kg (168 lb)   Height: 1.6 m (5' 3\")     Active Ambulatory Problems     Diagnosis Date Noted   • No Active Ambulatory Problems     Resolved Ambulatory Problems     Diagnosis Date Noted   • No Resolved Ambulatory Problems     Past Medical History:   Diagnosis Date   • Depression      Current Outpatient Prescriptions on File Prior to Visit   Medication Sig Dispense Refill   • triamcinolone acetonide (KENALOG) 0.1 % Cream Apply thin film to affected areas twice daily until symptoms resolve 1 Tube 0   • pimecrolimus (ELIDEL) 1 % cream Apply 1 Application to affected area(s) 2 times a day. 30 g 1   • " I spoke with her on the phone and reviewed her CT results. I informed her that I saw no hernia.  I only see some thickening of umbilical skin, which is non specific.  She asked my opinion about how to proceed, and I told her that I could perform an incisional biopsy of the lesion, so that we can know what it is.  I informed her that I expect it could deform the umbilicus.  She explained that she has had no new pain, rapid growth of the area, or any skin ulceration.  In the face of these findings, we agreed that we can observe the lesion before proceeding with biopsy. I would recommend biopsy if she has new pain, new growth in the size of the lesion, drainage, induration.  She can call the office with any questions or concerns.  She agreed to monitor the lesion and keep our office posted.     Hydrocortisone-Aloe Vera (CORTIZONE-10/ALOE) 1 % Cream by Apply externally route.     • omeprazole (PRILOSEC) 20 MG CPDR Take 1 Cap by mouth every day. 30 Cap 11   • buPROPion (WELLBUTRIN) 150 MG XL tablet Take 1 Tab by mouth every morning. 30 Tab 0   • fluoxetine (PROZAC) 10 MG CAPS Take 1 Cap by mouth every day. 21 Each 0   • alprazolam (XANAX) 0.5 MG TABS Take 1 Tab by mouth 3 times a day as needed for Sleep and Anxiety. 30 Each 1   • fluoxetine (PROZAC) 20 MG tablet Take 1 Tab by mouth every day. 30 Tab 3   • zolpidem (AMBIEN) 10 MG TABS Take 1 Tab by mouth at bedtime as needed for Sleep. 30 Each 1   • ciprofloxacin (CIPRO) 500 MG TABS Take 1 Tab by mouth 2 times a day. 20 Each 0   • buPROPion (WELLBUTRIN) 300 MG XL tablet Take 1 Tab by mouth every morning. 30 Tab 3   • betamethasone valerate (VALISONE) 0.1 % CREA Apply  to affected area(s) 2 times a day. Use for 10 days, then break for one week 45 g 1   • montelukast (SINGULAIR) 10 MG TABS Take 1 Tab by mouth every day. 30 Each 11   • mometasone (NASONEX) 50 MCG/ACT nasal spray Spray 2 Act in nose every day. Each nostril 1 Inhaler 6     No current facility-administered medications on file prior to visit.      Gargles, Cepacol lozenges, Aleve/Advil as needed for throat pain  Family History   Problem Relation Age of Onset   • Diabetes Father      border-line   • Hypertension Father    • Thyroid Sister    • Psychiatry Sister      anxiety     Patient has no known allergies.              Assessment/Plan:     ·  urt.[email or web address]      · rx meds;topicals

## 2019-09-04 DIAGNOSIS — T78.40XA ALLERGIC STATE, INITIAL ENCOUNTER: ICD-10-CM

## 2019-09-05 RX ORDER — MONTELUKAST SODIUM 10 MG/1
TABLET ORAL
Qty: 90 TAB | Refills: 2 | Status: SHIPPED | OUTPATIENT
Start: 2019-09-05 | End: 2020-04-02 | Stop reason: SDUPTHER

## 2020-04-02 DIAGNOSIS — T78.40XA ALLERGIC STATE, INITIAL ENCOUNTER: ICD-10-CM

## 2020-04-03 RX ORDER — ALBUTEROL SULFATE 90 UG/1
2 AEROSOL, METERED RESPIRATORY (INHALATION) EVERY 6 HOURS PRN
Qty: 8.5 G | Refills: 1 | Status: SHIPPED | OUTPATIENT
Start: 2020-04-03 | End: 2020-06-08

## 2020-04-03 RX ORDER — MONTELUKAST SODIUM 10 MG/1
10 TABLET ORAL
Qty: 90 TAB | Refills: 2 | Status: SHIPPED | OUTPATIENT
Start: 2020-04-03 | End: 2021-01-18

## 2020-06-08 DIAGNOSIS — T78.40XA ALLERGIC STATE, INITIAL ENCOUNTER: ICD-10-CM

## 2020-06-09 RX ORDER — ALBUTEROL SULFATE 90 UG/1
2 AEROSOL, METERED RESPIRATORY (INHALATION) EVERY 6 HOURS PRN
Qty: 18 INHALER | Refills: 0 | Status: SHIPPED | OUTPATIENT
Start: 2020-06-09 | End: 2020-10-20

## 2020-06-15 ENCOUNTER — PATIENT MESSAGE (OUTPATIENT)
Dept: MEDICAL GROUP | Facility: MEDICAL CENTER | Age: 45
End: 2020-06-15

## 2020-08-12 ENCOUNTER — TELEMEDICINE (OUTPATIENT)
Dept: MEDICAL GROUP | Facility: MEDICAL CENTER | Age: 45
End: 2020-08-12
Payer: COMMERCIAL

## 2020-08-12 VITALS — BODY MASS INDEX: 23.04 KG/M2 | WEIGHT: 130 LBS | HEART RATE: 60 BPM | HEIGHT: 63 IN

## 2020-08-12 DIAGNOSIS — Z00.00 PREVENTATIVE HEALTH CARE: ICD-10-CM

## 2020-08-12 DIAGNOSIS — R10.11 RUQ PAIN: ICD-10-CM

## 2020-08-12 PROCEDURE — 99214 OFFICE O/P EST MOD 30 MIN: CPT | Mod: 95,CR | Performed by: PHYSICIAN ASSISTANT

## 2020-08-12 NOTE — PROGRESS NOTES
Telemedicine Visit: Established Patient     This encounter was conducted via [employer]  Verbal consent was obtained. Patient's identity was verified.    Subjective:   CC: abdominal pain   Cindy Yung is a 45 y.o. female presenting for evaluation and management of:   Started to have RUQ pain on Friday with some pain in mid and RLQ. Has N, no V, no C, can't eat, no appetite, everything makes her nausous even a glass of water. Pain has been getting worse, Monday was worse. Not sure if she has fever, no dysuria.   Has colon polyps, w 2 prior colonoscopy, no fhx of cancer,  No blood in stool,      was recently diagnosed w stage 4 thyroid cancer and had radioactive iodine ablation, she took him to hospital w possible exposure to radioactive iodine.       ROS   Denies any recent fevers or chills. No nausea or vomiting. No chest pains or shortness of breath.     Allergies   Allergen Reactions   • Benadryl [Diphenhydramine]        Current medicines (including changes today)  Current Outpatient Medications   Medication Sig Dispense Refill   • albuterol 108 (90 Base) MCG/ACT Aero Soln inhalation aerosol INHALE 2 PUFFS BY MOUTH EVERY 6 HOURS AS NEEDED FOR SHORTNESS OF BREATH 18 Inhaler 0   • montelukast (SINGULAIR) 10 MG Tab Take 1 Tab by mouth every day. 90 Tab 2   • fluocinonide (LIDEX) 0.05 % Cream Apply to affected areas twice daily for 5 days, take 2 days and then repeat. 1 Tube 1   • triamcinolone acetonide (KENALOG) 0.1 % Cream Apply thin film to affected areas twice daily until symptoms resolve 1 Tube 0   • betamethasone valerate (VALISONE) 0.1 % CREA Apply  to affected area(s) 2 times a day. Use for 10 days, then break for one week 45 g 1   • MethylPREDNISolone (MEDROL DOSEPAK) 4 MG Tablet Therapy Pack U.D. (Patient not taking: Reported on 3/26/2019) 1 Tab 4   • pimecrolimus (ELIDEL) 1 % cream Apply 1 Application to affected area(s) 2 times a day. (Patient not taking: Reported on 3/26/2019) 30 g 1   •  "Hydrocortisone-Aloe Vera (CORTIZONE-10/ALOE) 1 % Cream by Apply externally route.     • omeprazole (PRILOSEC) 20 MG CPDR Take 1 Cap by mouth every day. (Patient not taking: Reported on 3/26/2019) 30 Cap 11   • fluoxetine (PROZAC) 10 MG CAPS Take 1 Cap by mouth every day. (Patient not taking: Reported on 3/26/2019) 21 Each 0   • fluoxetine (PROZAC) 20 MG tablet Take 1 Tab by mouth every day. (Patient not taking: Reported on 3/26/2019) 30 Tab 3   • montelukast (SINGULAIR) 10 MG TABS Take 1 Tab by mouth every day. (Patient not taking: Reported on 3/26/2019) 30 Each 11   • mometasone (NASONEX) 50 MCG/ACT nasal spray Spray 2 Act in nose every day. Each nostril (Patient not taking: Reported on 3/26/2019) 1 Inhaler 6     No current facility-administered medications for this visit.        Patient Active Problem List    Diagnosis Date Noted   • Eczema 04/09/2019   • History of attempted suicide 04/09/2019   • Borderline personality disorder (HCC) 04/09/2019   • Dissociative identity disorder (HCC) 04/09/2019   • Anxiety 04/09/2019   • Depression 04/09/2019       Family History   Problem Relation Age of Onset   • Diabetes Father         border-line   • Hypertension Father    • Thyroid Sister    • Psychiatric Illness Sister         anxiety       She  has a past medical history of Depression.  She  has a past surgical history that includes tumor excision with biopsy and abdominal hysterectomy total.       Objective:   Vitals obtained by patient:    Pulse 60   Ht 1.6 m (5' 3\")   Wt 59 kg (130 lb)   Physical Exam:  Constitutional: Alert, no distress, well-groomed.  Skin: No rashes in visible areas.  Eye: Round. Conjunctiva clear, lids normal. No icterus.   ENMT: Lips pink without lesions, good dentition, moist mucous membranes. Phonation normal.  Neck: No masses, no thyromegaly. Moves freely without pain.  CV: Pulse as reported by patient  Respiratory: Unlabored respiratory effort, no cough or audible wheeze  Psych: Alert and " oriented x3, normal affect and mood.   GI: pos TTP by pt over RUQ and some over epigastric area       Assessment and Plan:   The following treatment plan was discussed:     1. RUQ pain  Has called to see her GI  -CMP  - REFERRAL TO GASTROENTEROLOGY  - US-RUQ; Future  - URINALYSIS,CULTURE IF INDICATED; Future    2. Preventative health care    - CBC WITH DIFFERENTIAL; Future  - Comp Metabolic Panel; Future  - Lipid Profile; Future  - TSH WITH REFLEX TO FT4; Future  - VITAMIN D,25 HYDROXY; Future    We discussed red flags incuding worsening pain, fever, shortness of breath or overall decline in health. Patient verbalize understanding and will either call our office or present to the emergency room.           Follow-up: prn

## 2020-08-13 ENCOUNTER — HOSPITAL ENCOUNTER (OUTPATIENT)
Dept: RADIOLOGY | Facility: MEDICAL CENTER | Age: 45
End: 2020-08-13
Attending: PHYSICIAN ASSISTANT
Payer: COMMERCIAL

## 2020-08-13 DIAGNOSIS — R10.11 RUQ PAIN: ICD-10-CM

## 2020-08-13 PROCEDURE — 76705 ECHO EXAM OF ABDOMEN: CPT

## 2020-08-14 ENCOUNTER — HOSPITAL ENCOUNTER (OUTPATIENT)
Dept: LAB | Facility: MEDICAL CENTER | Age: 45
End: 2020-08-14
Attending: PHYSICIAN ASSISTANT
Payer: COMMERCIAL

## 2020-08-14 DIAGNOSIS — R10.11 RUQ PAIN: ICD-10-CM

## 2020-08-14 DIAGNOSIS — Z00.00 PREVENTATIVE HEALTH CARE: ICD-10-CM

## 2020-08-14 LAB
25(OH)D3 SERPL-MCNC: 89 NG/ML (ref 30–100)
ALBUMIN SERPL BCP-MCNC: 4.5 G/DL (ref 3.2–4.9)
ALBUMIN/GLOB SERPL: 2 G/DL
ALP SERPL-CCNC: 35 U/L (ref 30–99)
ALT SERPL-CCNC: 13 U/L (ref 2–50)
AMORPH CRY #/AREA URNS HPF: PRESENT /HPF
ANION GAP SERPL CALC-SCNC: 12 MMOL/L (ref 7–16)
APPEARANCE UR: ABNORMAL
AST SERPL-CCNC: 15 U/L (ref 12–45)
BACTERIA #/AREA URNS HPF: NEGATIVE /HPF
BASOPHILS # BLD AUTO: 1.2 % (ref 0–1.8)
BASOPHILS # BLD: 0.08 K/UL (ref 0–0.12)
BILIRUB SERPL-MCNC: 0.8 MG/DL (ref 0.1–1.5)
BILIRUB UR QL STRIP.AUTO: NEGATIVE
BUN SERPL-MCNC: 13 MG/DL (ref 8–22)
CALCIUM SERPL-MCNC: 9.6 MG/DL (ref 8.5–10.5)
CHLORIDE SERPL-SCNC: 103 MMOL/L (ref 96–112)
CHOLEST SERPL-MCNC: 192 MG/DL (ref 100–199)
CO2 SERPL-SCNC: 25 MMOL/L (ref 20–33)
COLOR UR: YELLOW
CREAT SERPL-MCNC: 0.76 MG/DL (ref 0.5–1.4)
EOSINOPHIL # BLD AUTO: 0.09 K/UL (ref 0–0.51)
EOSINOPHIL NFR BLD: 1.4 % (ref 0–6.9)
EPI CELLS #/AREA URNS HPF: ABNORMAL /HPF
ERYTHROCYTE [DISTWIDTH] IN BLOOD BY AUTOMATED COUNT: 43.4 FL (ref 35.9–50)
FASTING STATUS PATIENT QL REPORTED: NORMAL
GLOBULIN SER CALC-MCNC: 2.3 G/DL (ref 1.9–3.5)
GLUCOSE SERPL-MCNC: 91 MG/DL (ref 65–99)
GLUCOSE UR STRIP.AUTO-MCNC: NEGATIVE MG/DL
HCT VFR BLD AUTO: 40.4 % (ref 37–47)
HDLC SERPL-MCNC: 71 MG/DL
HGB BLD-MCNC: 13.6 G/DL (ref 12–16)
HYALINE CASTS #/AREA URNS LPF: ABNORMAL /LPF
IMM GRANULOCYTES # BLD AUTO: 0.02 K/UL (ref 0–0.11)
IMM GRANULOCYTES NFR BLD AUTO: 0.3 % (ref 0–0.9)
KETONES UR STRIP.AUTO-MCNC: NEGATIVE MG/DL
LDLC SERPL CALC-MCNC: 110 MG/DL
LEUKOCYTE ESTERASE UR QL STRIP.AUTO: NEGATIVE
LYMPHOCYTES # BLD AUTO: 1.58 K/UL (ref 1–4.8)
LYMPHOCYTES NFR BLD: 24.4 % (ref 22–41)
MCH RBC QN AUTO: 32.4 PG (ref 27–33)
MCHC RBC AUTO-ENTMCNC: 33.7 G/DL (ref 33.6–35)
MCV RBC AUTO: 96.2 FL (ref 81.4–97.8)
MICRO URNS: ABNORMAL
MONOCYTES # BLD AUTO: 0.52 K/UL (ref 0–0.85)
MONOCYTES NFR BLD AUTO: 8 % (ref 0–13.4)
NEUTROPHILS # BLD AUTO: 4.19 K/UL (ref 2–7.15)
NEUTROPHILS NFR BLD: 64.7 % (ref 44–72)
NITRITE UR QL STRIP.AUTO: NEGATIVE
NRBC # BLD AUTO: 0 K/UL
NRBC BLD-RTO: 0 /100 WBC
PH UR STRIP.AUTO: 7 [PH] (ref 5–8)
PLATELET # BLD AUTO: 217 K/UL (ref 164–446)
PMV BLD AUTO: 10.5 FL (ref 9–12.9)
POTASSIUM SERPL-SCNC: 4.1 MMOL/L (ref 3.6–5.5)
PROT SERPL-MCNC: 6.8 G/DL (ref 6–8.2)
PROT UR QL STRIP: NEGATIVE MG/DL
RBC # BLD AUTO: 4.2 M/UL (ref 4.2–5.4)
RBC # URNS HPF: ABNORMAL /HPF
RBC UR QL AUTO: NEGATIVE
SODIUM SERPL-SCNC: 140 MMOL/L (ref 135–145)
SP GR UR STRIP.AUTO: 1.02
TRIGL SERPL-MCNC: 57 MG/DL (ref 0–149)
TSH SERPL DL<=0.005 MIU/L-ACNC: 0.51 UIU/ML (ref 0.38–5.33)
UROBILINOGEN UR STRIP.AUTO-MCNC: 0.2 MG/DL
WBC # BLD AUTO: 6.5 K/UL (ref 4.8–10.8)
WBC #/AREA URNS HPF: ABNORMAL /HPF

## 2020-08-14 PROCEDURE — 36415 COLL VENOUS BLD VENIPUNCTURE: CPT

## 2020-08-14 PROCEDURE — 80061 LIPID PANEL: CPT

## 2020-08-14 PROCEDURE — 82306 VITAMIN D 25 HYDROXY: CPT

## 2020-08-14 PROCEDURE — 81001 URINALYSIS AUTO W/SCOPE: CPT

## 2020-08-14 PROCEDURE — 84443 ASSAY THYROID STIM HORMONE: CPT

## 2020-08-14 PROCEDURE — 85025 COMPLETE CBC W/AUTO DIFF WBC: CPT

## 2020-08-14 PROCEDURE — 80053 COMPREHEN METABOLIC PANEL: CPT

## 2020-08-22 ENCOUNTER — HOSPITAL ENCOUNTER (OUTPATIENT)
Dept: RADIOLOGY | Facility: MEDICAL CENTER | Age: 45
End: 2020-08-22
Attending: PHYSICIAN ASSISTANT
Payer: COMMERCIAL

## 2020-08-22 ENCOUNTER — PATIENT MESSAGE (OUTPATIENT)
Dept: MEDICAL GROUP | Facility: MEDICAL CENTER | Age: 45
End: 2020-08-22

## 2020-08-22 DIAGNOSIS — Z12.31 VISIT FOR SCREENING MAMMOGRAM: ICD-10-CM

## 2020-09-01 ENCOUNTER — HOSPITAL ENCOUNTER (OUTPATIENT)
Dept: RADIOLOGY | Facility: MEDICAL CENTER | Age: 45
End: 2020-09-01
Attending: INTERNAL MEDICINE
Payer: COMMERCIAL

## 2020-09-01 DIAGNOSIS — R10.11 ABDOMINAL PAIN, RIGHT UPPER QUADRANT: ICD-10-CM

## 2020-09-01 PROCEDURE — A9537 TC99M MEBROFENIN: HCPCS

## 2020-09-01 NOTE — PROGRESS NOTES
I  talked to pt, she has decided not to do any mammogram at this point.  She is not happy with [facility] and said if she wants to get mammogram done she will go to [facility] next time.    Zaida Leonardo P.A.-C.

## 2020-09-02 DIAGNOSIS — N83.201 CYST OF RIGHT OVARY: ICD-10-CM

## 2020-09-11 ENCOUNTER — HOSPITAL ENCOUNTER (OUTPATIENT)
Dept: RADIOLOGY | Facility: MEDICAL CENTER | Age: 45
End: 2020-09-11
Attending: PHYSICIAN ASSISTANT
Payer: COMMERCIAL

## 2020-09-11 DIAGNOSIS — N83.201 CYST OF RIGHT OVARY: ICD-10-CM

## 2020-09-11 PROCEDURE — 76830 TRANSVAGINAL US NON-OB: CPT

## 2020-09-17 ENCOUNTER — HOSPITAL ENCOUNTER (OUTPATIENT)
Dept: HOSPITAL 8 - STAR | Age: 45
Discharge: HOME | End: 2020-09-17
Attending: OBSTETRICS & GYNECOLOGY
Payer: COMMERCIAL

## 2020-09-17 DIAGNOSIS — Z20.828: ICD-10-CM

## 2020-09-17 DIAGNOSIS — N83.201: ICD-10-CM

## 2020-09-17 DIAGNOSIS — R10.2: ICD-10-CM

## 2020-09-17 DIAGNOSIS — Z01.812: Primary | ICD-10-CM

## 2020-09-17 LAB
BASOPHILS # BLD AUTO: 0.06 X10^3/UL (ref 0–0.1)
BASOPHILS NFR BLD AUTO: 1 % (ref 0–1)
EOSINOPHIL # BLD AUTO: 0.02 X10^3/UL (ref 0–0.4)
EOSINOPHIL NFR BLD AUTO: 0 % (ref 1–7)
ERYTHROCYTE [DISTWIDTH] IN BLOOD BY AUTOMATED COUNT: 12.6 % (ref 9.6–15.2)
LYMPHOCYTES # BLD AUTO: 1.72 X10^3/UL (ref 1–3.4)
LYMPHOCYTES NFR BLD AUTO: 24 % (ref 22–44)
MCH RBC QN AUTO: 31.2 PG (ref 27–34.8)
MCHC RBC AUTO-ENTMCNC: 32.8 G/DL (ref 32.4–35.8)
MCV RBC AUTO: 95.3 FL (ref 80–100)
MD: NO
MONOCYTES # BLD AUTO: 0.47 X10^3/UL (ref 0.2–0.8)
MONOCYTES NFR BLD AUTO: 7 % (ref 2–9)
NEUTROPHILS # BLD AUTO: 4.88 X10^3/UL (ref 1.8–6.8)
NEUTROPHILS NFR BLD AUTO: 68 % (ref 42–75)
PLATELET # BLD AUTO: 244 X10^3/UL (ref 130–400)
PMV BLD AUTO: 8.6 FL (ref 7.4–10.4)
RBC # BLD AUTO: 4.42 X10^6/UL (ref 3.82–5.3)

## 2020-09-17 PROCEDURE — 85025 COMPLETE CBC W/AUTO DIFF WBC: CPT

## 2020-09-17 PROCEDURE — 87635 SARS-COV-2 COVID-19 AMP PRB: CPT

## 2020-09-17 PROCEDURE — 86304 IMMUNOASSAY TUMOR CA 125: CPT

## 2020-09-17 PROCEDURE — 36415 COLL VENOUS BLD VENIPUNCTURE: CPT

## 2020-09-21 ENCOUNTER — HOSPITAL ENCOUNTER (OUTPATIENT)
Dept: HOSPITAL 8 - OUT | Age: 45
Discharge: HOME | End: 2020-09-21
Attending: OBSTETRICS & GYNECOLOGY
Payer: COMMERCIAL

## 2020-09-21 VITALS — HEIGHT: 63 IN | BODY MASS INDEX: 22.73 KG/M2 | WEIGHT: 128.31 LBS

## 2020-09-21 VITALS — DIASTOLIC BLOOD PRESSURE: 78 MMHG | SYSTOLIC BLOOD PRESSURE: 128 MMHG

## 2020-09-21 DIAGNOSIS — R10.2: Primary | ICD-10-CM

## 2020-09-21 DIAGNOSIS — F32.9: ICD-10-CM

## 2020-09-21 DIAGNOSIS — Z79.899: ICD-10-CM

## 2020-09-21 DIAGNOSIS — Z88.8: ICD-10-CM

## 2020-09-21 DIAGNOSIS — N73.6: ICD-10-CM

## 2020-09-21 DIAGNOSIS — Z90.710: ICD-10-CM

## 2020-09-21 DIAGNOSIS — D27.0: ICD-10-CM

## 2020-09-21 DIAGNOSIS — N83.12: ICD-10-CM

## 2020-09-21 DIAGNOSIS — J45.909: ICD-10-CM

## 2020-09-21 DIAGNOSIS — Z98.890: ICD-10-CM

## 2020-09-21 PROCEDURE — 36415 COLL VENOUS BLD VENIPUNCTURE: CPT

## 2020-09-21 PROCEDURE — 86850 RBC ANTIBODY SCREEN: CPT

## 2020-09-21 PROCEDURE — 88112 CYTOPATH CELL ENHANCE TECH: CPT

## 2020-09-21 PROCEDURE — 58661 LAPAROSCOPY REMOVE ADNEXA: CPT

## 2020-09-21 PROCEDURE — 88305 TISSUE EXAM BY PATHOLOGIST: CPT

## 2020-09-21 PROCEDURE — 86900 BLOOD TYPING SEROLOGIC ABO: CPT

## 2020-10-03 ENCOUNTER — APPOINTMENT (OUTPATIENT)
Dept: RADIOLOGY | Facility: MEDICAL CENTER | Age: 45
End: 2020-10-03
Attending: PHYSICIAN ASSISTANT
Payer: COMMERCIAL

## 2020-10-03 DIAGNOSIS — Z12.31 VISIT FOR SCREENING MAMMOGRAM: ICD-10-CM

## 2020-10-03 PROCEDURE — 77067 SCR MAMMO BI INCL CAD: CPT

## 2020-10-17 DIAGNOSIS — T78.40XA ALLERGY, INITIAL ENCOUNTER: ICD-10-CM

## 2020-10-20 RX ORDER — ALBUTEROL SULFATE 90 UG/1
2 AEROSOL, METERED RESPIRATORY (INHALATION) EVERY 6 HOURS PRN
Qty: 8.5 G | Refills: 3 | Status: SHIPPED | OUTPATIENT
Start: 2020-10-20

## 2020-11-30 ENCOUNTER — HOSPITAL ENCOUNTER (OUTPATIENT)
Dept: LAB | Facility: MEDICAL CENTER | Age: 45
End: 2020-11-30
Attending: PHYSICIAN ASSISTANT
Payer: COMMERCIAL

## 2020-11-30 DIAGNOSIS — E55.9 VITAMIN D DEFICIENCY: ICD-10-CM

## 2020-11-30 LAB — 25(OH)D3 SERPL-MCNC: 95 NG/ML (ref 30–100)

## 2020-11-30 PROCEDURE — 36415 COLL VENOUS BLD VENIPUNCTURE: CPT

## 2020-11-30 PROCEDURE — 82306 VITAMIN D 25 HYDROXY: CPT

## 2021-01-17 DIAGNOSIS — T78.40XA ALLERGY, INITIAL ENCOUNTER: ICD-10-CM

## 2021-01-18 ENCOUNTER — PATIENT MESSAGE (OUTPATIENT)
Dept: MEDICAL GROUP | Facility: MEDICAL CENTER | Age: 46
End: 2021-01-18

## 2021-01-18 RX ORDER — MONTELUKAST SODIUM 10 MG/1
TABLET ORAL
Qty: 90 TAB | Refills: 2 | Status: SHIPPED | OUTPATIENT
Start: 2021-01-18

## 2021-04-24 ENCOUNTER — IMMUNIZATION (OUTPATIENT)
Dept: FAMILY PLANNING/WOMEN'S HEALTH CLINIC | Facility: IMMUNIZATION CENTER | Age: 46
End: 2021-04-24

## 2021-04-24 DIAGNOSIS — Z23 ENCOUNTER FOR VACCINATION: Primary | ICD-10-CM

## 2021-04-24 PROCEDURE — 0002A PFIZER SARS-COV-2 VACCINE: CPT

## 2021-04-24 PROCEDURE — 91300 PFIZER SARS-COV-2 VACCINE: CPT

## 2021-05-27 ENCOUNTER — NURSE TRIAGE (OUTPATIENT)
Dept: HEALTH INFORMATION MANAGEMENT | Facility: OTHER | Age: 46
End: 2021-05-27

## 2021-05-27 NOTE — TELEPHONE ENCOUNTER
"Pt having episodes of severe abdominal pain, increased weight loss, irregular bloating and diarrhea. Advised ED per protocol. Pt does not have insurance until July but will monitor symptoms and go in if needed.    Reason for Disposition  • SEVERE abdominal pain (e.g., excruciating)    Additional Information  • Negative: Passed out (i.e., fainted, collapsed and was not responding)  • Negative: Shock suspected (e.g., cold/pale/clammy skin, too weak to stand, low BP, rapid pulse)  • Negative: Sounds like a life-threatening emergency to the triager  • Negative: Chest pain  • Negative: Pain is mainly in upper abdomen (if needed ask: 'is it mainly above the belly button?')  • Negative: Abdominal pain and pregnant > 20 weeks  • Negative: Abdominal pain and pregnant < 20 weeks    Answer Assessment - Initial Assessment Questions  1. LOCATION: \"Where does it hurt?\"       LEFT side  2. RADIATION: \"Does the pain shoot anywhere else?\" (e.g., chest, back)      Was in middle  3. ONSET: \"When did the pain begin?\" (e.g., minutes, hours or days ago)       A while  4. SUDDEN: \"Gradual or sudden onset?\"      sudden  5. PATTERN \"Does the pain come and go, or is it constant?\"     - If constant: \"Is it getting better, staying the same, or worsening?\"       (Note: Constant means the pain never goes away completely; most serious pain is constant and it progresses)      - If intermittent: \"How long does it last?\" \"Do you have pain now?\"      (Note: Intermittent means the pain goes away completely between bouts)      Comes and goes  6. SEVERITY: \"How bad is the pain?\"  (e.g., Scale 1-10; mild, moderate, or severe)    - MILD (1-3): doesn't interfere with normal activities, abdomen soft and not tender to touch     - MODERATE (4-7): interferes with normal activities or awakens from sleep, tender to touch     - SEVERE (8-10): excruciating pain, doubled over, unable to do any normal activities       severe  7. RECURRENT SYMPTOM: \"Have you ever had " "this type of abdominal pain before?\" If so, ask: \"When was the last time?\" and \"What happened that time?\"       no  8. CAUSE: \"What do you think is causing the abdominal pain?\"      unknown  9. RELIEVING/AGGRAVATING FACTORS: \"What makes it better or worse?\" (e.g., movement, antacids, bowel movement)      none  10. OTHER SYMPTOMS: \"Has there been any vomiting, diarrhea, constipation, or urine problems?\"        Bloating, weight loss  11. PREGNANCY: \"Is there any chance you are pregnant?\" \"When was your last menstrual period?\"        no    Protocols used: ABDOMINAL PAIN - FEMALE-A-OH      "

## 2021-07-26 ENCOUNTER — OFFICE VISIT (OUTPATIENT)
Dept: URGENT CARE | Facility: CLINIC | Age: 46
End: 2021-07-26
Payer: COMMERCIAL

## 2021-07-26 ENCOUNTER — HOSPITAL ENCOUNTER (OUTPATIENT)
Dept: LAB | Facility: MEDICAL CENTER | Age: 46
End: 2021-07-26
Attending: PHYSICIAN ASSISTANT
Payer: COMMERCIAL

## 2021-07-26 ENCOUNTER — APPOINTMENT (OUTPATIENT)
Dept: RADIOLOGY | Facility: IMAGING CENTER | Age: 46
End: 2021-07-26
Attending: PHYSICIAN ASSISTANT
Payer: COMMERCIAL

## 2021-07-26 VITALS
RESPIRATION RATE: 16 BRPM | TEMPERATURE: 99.1 F | BODY MASS INDEX: 23.21 KG/M2 | SYSTOLIC BLOOD PRESSURE: 124 MMHG | DIASTOLIC BLOOD PRESSURE: 70 MMHG | HEART RATE: 55 BPM | OXYGEN SATURATION: 98 % | HEIGHT: 63 IN | WEIGHT: 131 LBS

## 2021-07-26 DIAGNOSIS — R19.4 BOWEL HABIT CHANGES: ICD-10-CM

## 2021-07-26 DIAGNOSIS — R10.84 GENERALIZED ABDOMINAL PAIN: ICD-10-CM

## 2021-07-26 LAB
ALBUMIN SERPL BCP-MCNC: 4.6 G/DL (ref 3.2–4.9)
ALBUMIN/GLOB SERPL: 1.8 G/DL
ALP SERPL-CCNC: 47 U/L (ref 30–99)
ALT SERPL-CCNC: 13 U/L (ref 2–50)
ANION GAP SERPL CALC-SCNC: 10 MMOL/L (ref 7–16)
AST SERPL-CCNC: 15 U/L (ref 12–45)
BASOPHILS # BLD AUTO: 2.2 % (ref 0–1.8)
BASOPHILS # BLD: 0.15 K/UL (ref 0–0.12)
BILIRUB SERPL-MCNC: 0.5 MG/DL (ref 0.1–1.5)
BUN SERPL-MCNC: 12 MG/DL (ref 8–22)
CALCIUM SERPL-MCNC: 9.4 MG/DL (ref 8.5–10.5)
CHLORIDE SERPL-SCNC: 103 MMOL/L (ref 96–112)
CO2 SERPL-SCNC: 28 MMOL/L (ref 20–33)
CREAT SERPL-MCNC: 0.73 MG/DL (ref 0.5–1.4)
EOSINOPHIL # BLD AUTO: 0.13 K/UL (ref 0–0.51)
EOSINOPHIL NFR BLD: 1.9 % (ref 0–6.9)
ERYTHROCYTE [DISTWIDTH] IN BLOOD BY AUTOMATED COUNT: 45.3 FL (ref 35.9–50)
GLOBULIN SER CALC-MCNC: 2.5 G/DL (ref 1.9–3.5)
GLUCOSE SERPL-MCNC: 77 MG/DL (ref 65–99)
HCT VFR BLD AUTO: 42 % (ref 37–47)
HGB BLD-MCNC: 14.1 G/DL (ref 12–16)
IMM GRANULOCYTES # BLD AUTO: 0.02 K/UL (ref 0–0.11)
IMM GRANULOCYTES NFR BLD AUTO: 0.3 % (ref 0–0.9)
LYMPHOCYTES # BLD AUTO: 2.46 K/UL (ref 1–4.8)
LYMPHOCYTES NFR BLD: 35.9 % (ref 22–41)
MCH RBC QN AUTO: 32.6 PG (ref 27–33)
MCHC RBC AUTO-ENTMCNC: 33.6 G/DL (ref 33.6–35)
MCV RBC AUTO: 97.2 FL (ref 81.4–97.8)
MONOCYTES # BLD AUTO: 0.52 K/UL (ref 0–0.85)
MONOCYTES NFR BLD AUTO: 7.6 % (ref 0–13.4)
NEUTROPHILS # BLD AUTO: 3.57 K/UL (ref 2–7.15)
NEUTROPHILS NFR BLD: 52.1 % (ref 44–72)
NRBC # BLD AUTO: 0 K/UL
NRBC BLD-RTO: 0 /100 WBC
PLATELET # BLD AUTO: 231 K/UL (ref 164–446)
PMV BLD AUTO: 10.9 FL (ref 9–12.9)
POTASSIUM SERPL-SCNC: 4.1 MMOL/L (ref 3.6–5.5)
PROT SERPL-MCNC: 7.1 G/DL (ref 6–8.2)
RBC # BLD AUTO: 4.32 M/UL (ref 4.2–5.4)
SODIUM SERPL-SCNC: 141 MMOL/L (ref 135–145)
WBC # BLD AUTO: 6.9 K/UL (ref 4.8–10.8)

## 2021-07-26 PROCEDURE — 36415 COLL VENOUS BLD VENIPUNCTURE: CPT

## 2021-07-26 PROCEDURE — 85025 COMPLETE CBC W/AUTO DIFF WBC: CPT

## 2021-07-26 PROCEDURE — 80053 COMPREHEN METABOLIC PANEL: CPT

## 2021-07-26 PROCEDURE — 74018 RADEX ABDOMEN 1 VIEW: CPT | Mod: TC | Performed by: PHYSICIAN ASSISTANT

## 2021-07-26 PROCEDURE — 99214 OFFICE O/P EST MOD 30 MIN: CPT | Performed by: PHYSICIAN ASSISTANT

## 2021-07-26 ASSESSMENT — ENCOUNTER SYMPTOMS
CONSTIPATION: 1
COUGH: 0
SHORTNESS OF BREATH: 0
FLANK PAIN: 0
NAUSEA: 1
ABDOMINAL PAIN: 1
MYALGIAS: 0
HEADACHES: 0
BLOOD IN STOOL: 0
DIARRHEA: 1
PALPITATIONS: 1
CHILLS: 0
VOMITING: 0
DIZZINESS: 0
FEVER: 0

## 2021-07-26 ASSESSMENT — FIBROSIS 4 INDEX: FIB4 SCORE: 0.9

## 2021-07-26 NOTE — PROGRESS NOTES
Subjective:   Cindy Yung is a 46 y.o. female who presents for Abdominal Pain (hx of pylps, bloated, hurts belly button radiating down, x3mths.hard to sit)      HPI  46 y.o. female presents to urgent care with new problem to provider of periumbilical abdominal pain, nausea, and bowel habit changes which has been intermittent and worsening over the last 3 months.    Denies vomiting or fevers.  She also reports intermittent sharp chest pain lasting for only seconds with no shortness of breath or exertional symptoms.   Hx of total hysterectomy, denies other abdominal surgeries. Hx of colonoscopy and colon polyp removal. No hx of hernia or increased abdominal pain with lifting.   Denies other associated aggravating or alleviating factors.     Review of Systems   Constitutional: Negative for chills, fever and malaise/fatigue.   Respiratory: Negative for cough and shortness of breath.    Cardiovascular: Positive for palpitations. Negative for chest pain and leg swelling.   Gastrointestinal: Positive for abdominal pain, constipation, diarrhea and nausea. Negative for blood in stool, melena and vomiting.   Genitourinary: Negative for dysuria, flank pain, frequency, hematuria and urgency.   Musculoskeletal: Negative for myalgias.   Neurological: Negative for dizziness and headaches.       Patient Active Problem List   Diagnosis   • Eczema   • History of attempted suicide   • Borderline personality disorder (HCC)   • Dissociative identity disorder (HCC)   • Anxiety   • Depression     Past Surgical History:   Procedure Laterality Date   • ABDOMINAL HYSTERECTOMY TOTAL     • TUMOR EXCISION WITH BIOPSY      stomach - benign     Social History     Tobacco Use   • Smoking status: Never Smoker   • Smokeless tobacco: Never Used   Vaping Use   • Vaping Use: Never used   Substance Use Topics   • Alcohol use: Yes     Comment: mod   • Drug use: Yes     Types: Marijuana, Inhaled     Comment: Vapes marijuana when can't sleep     "  Family History   Problem Relation Age of Onset   • Diabetes Father         border-line   • Hypertension Father    • Thyroid Sister    • Psychiatric Illness Sister         anxiety      (Allergies, Medications, & Tobacco/Substance Use were reconciled by the Medical Assistant and reviewed by myself. The family history is prepopulated)     Objective:     /70 (BP Location: Left arm, Patient Position: Sitting, BP Cuff Size: Adult)   Pulse (!) 55   Temp 37.3 °C (99.1 °F) (Temporal)   Resp 16   Ht 1.6 m (5' 3\")   Wt 59.4 kg (131 lb)   SpO2 98%   BMI 23.21 kg/m²     Physical Exam  Vitals reviewed.   Constitutional:       General: She is not in acute distress.     Appearance: Normal appearance. She is not ill-appearing or diaphoretic.   HENT:      Head: Normocephalic and atraumatic.      Nose: Nose normal.      Mouth/Throat:      Mouth: Mucous membranes are moist.      Pharynx: Oropharynx is clear.   Eyes:      General: No scleral icterus.     Conjunctiva/sclera: Conjunctivae normal.   Cardiovascular:      Rate and Rhythm: Normal rate and regular rhythm.      Heart sounds: Normal heart sounds.   Pulmonary:      Breath sounds: Normal breath sounds.   Abdominal:      General: Abdomen is flat. Bowel sounds are increased. There is no distension.      Tenderness: There is generalized abdominal tenderness. There is no right CVA tenderness, left CVA tenderness, guarding or rebound.   Musculoskeletal:      Cervical back: Normal range of motion and neck supple.   Skin:     General: Skin is dry.      Capillary Refill: Capillary refill takes less than 2 seconds.      Coloration: Skin is not jaundiced or pale.   Neurological:      General: No focal deficit present.      Mental Status: She is alert and oriented to person, place, and time.   Psychiatric:         Mood and Affect: Mood normal.         Behavior: Behavior normal.         Thought Content: Thought content normal.         Judgment: Judgment normal. "         Assessment/Plan:     1. Generalized abdominal pain  [id number] VIEW    CBC WITH DIFFERENTIAL    Comp Metabolic Panel   2. Bowel habit changes       Narrative & Impression     7/26/2021 1:58 PM     HISTORY/REASON FOR EXAM:  Epigastric pain and left lower quadrant pain with generalized abdominal swelling.        TECHNIQUE/EXAM DESCRIPTION AND NUMBER OF VIEWS:  1 view(s) of the abdomen.     COMPARISON: None     FINDINGS:  There is a nonobstructive nonspecific bowel gas pattern.  There is no evidence of free intraperitoneal air.     The amount of stool is within normal limits.     There are no calcifications suspicious for urinary tract stones.    There are pelvic phleboliths.     Bones are unremarkable for age.     Lung bases are clear.     IMPRESSION:     1.  Unremarkable single view of the abdomen.         CBC and CMP ordered for further evaluation of generalized abdominal pain which has been ongoing over the last 3 months. Recommend follow up with PCP as scheduled for this Thursday.   Discussed red flags and reasons to proceed to ED including worsening abdominal pain or development of fevers, vomiting, or chest pain.   Patient is in no acute distress on exam in urgent care today and vital signs are stable.   Differential diagnosis, natural history, supportive care, and indications for immediate follow-up discussed.    Advised the patient to follow-up with the primary care physician for recheck, reevaluation, and consideration of further management.  Patient verbalized understanding of treatment plan and has no further questions regarding care.     I personally reviewed prior external notes and test results pertinent to today's visit. My total time spent caring for the patient on the day of the encounter that included review of prior records, obtaining history, examination, discussion of plan and return precautions was greater than 30 minutes.     Please note that this dictation was created using voice  recognition software. I have made a reasonable attempt to correct obvious errors, but I expect that there are errors of grammar and possibly content that I did not discover before finalizing the note.    This note was electronically signed by Nicolasa Poon PA-C

## 2021-07-27 ENCOUNTER — APPOINTMENT (OUTPATIENT)
Dept: RADIOLOGY | Facility: MEDICAL CENTER | Age: 46
End: 2021-07-27
Attending: EMERGENCY MEDICINE
Payer: COMMERCIAL

## 2021-07-27 ENCOUNTER — PATIENT MESSAGE (OUTPATIENT)
Dept: URGENT CARE | Facility: CLINIC | Age: 46
End: 2021-07-27

## 2021-07-27 ENCOUNTER — HOSPITAL ENCOUNTER (EMERGENCY)
Facility: MEDICAL CENTER | Age: 46
End: 2021-07-27
Attending: EMERGENCY MEDICINE
Payer: COMMERCIAL

## 2021-07-27 VITALS
OXYGEN SATURATION: 97 % | DIASTOLIC BLOOD PRESSURE: 61 MMHG | RESPIRATION RATE: 16 BRPM | WEIGHT: 130.73 LBS | SYSTOLIC BLOOD PRESSURE: 131 MMHG | HEART RATE: 55 BPM | BODY MASS INDEX: 23.16 KG/M2 | TEMPERATURE: 98 F | HEIGHT: 63 IN

## 2021-07-27 DIAGNOSIS — R07.9 ACUTE CHEST PAIN: ICD-10-CM

## 2021-07-27 DIAGNOSIS — F41.9 ANXIETY: ICD-10-CM

## 2021-07-27 DIAGNOSIS — F32.A DEPRESSION, UNSPECIFIED DEPRESSION TYPE: ICD-10-CM

## 2021-07-27 DIAGNOSIS — D18.03 HEMANGIOMA OF LIVER: ICD-10-CM

## 2021-07-27 DIAGNOSIS — R11.0 NAUSEA: ICD-10-CM

## 2021-07-27 DIAGNOSIS — E86.0 DEHYDRATION: ICD-10-CM

## 2021-07-27 DIAGNOSIS — R10.9 ACUTE ABDOMINAL PAIN: ICD-10-CM

## 2021-07-27 DIAGNOSIS — K59.09 OTHER CONSTIPATION: ICD-10-CM

## 2021-07-27 DIAGNOSIS — F12.90 EPISODIC CANNABIS USE: ICD-10-CM

## 2021-07-27 LAB
ALBUMIN SERPL BCP-MCNC: 4.6 G/DL (ref 3.2–4.9)
ALBUMIN/GLOB SERPL: 1.7 G/DL
ALP SERPL-CCNC: 51 U/L (ref 30–99)
ALT SERPL-CCNC: 14 U/L (ref 2–50)
ANION GAP SERPL CALC-SCNC: 12 MMOL/L (ref 7–16)
APPEARANCE UR: CLEAR
AST SERPL-CCNC: 19 U/L (ref 12–45)
BASOPHILS # BLD AUTO: 2 % (ref 0–1.8)
BASOPHILS # BLD: 0.17 K/UL (ref 0–0.12)
BILIRUB SERPL-MCNC: 1.1 MG/DL (ref 0.1–1.5)
BILIRUB UR QL STRIP.AUTO: NEGATIVE
BUN SERPL-MCNC: 11 MG/DL (ref 8–22)
CALCIUM SERPL-MCNC: 9.3 MG/DL (ref 8.5–10.5)
CHLORIDE SERPL-SCNC: 103 MMOL/L (ref 96–112)
CO2 SERPL-SCNC: 26 MMOL/L (ref 20–33)
COLOR UR: YELLOW
CREAT SERPL-MCNC: 0.69 MG/DL (ref 0.5–1.4)
EKG IMPRESSION: NORMAL
EOSINOPHIL # BLD AUTO: 0.13 K/UL (ref 0–0.51)
EOSINOPHIL NFR BLD: 1.5 % (ref 0–6.9)
ERYTHROCYTE [DISTWIDTH] IN BLOOD BY AUTOMATED COUNT: 45.1 FL (ref 35.9–50)
GLOBULIN SER CALC-MCNC: 2.7 G/DL (ref 1.9–3.5)
GLUCOSE SERPL-MCNC: 83 MG/DL (ref 65–99)
GLUCOSE UR STRIP.AUTO-MCNC: NEGATIVE MG/DL
HCT VFR BLD AUTO: 44.3 % (ref 37–47)
HGB BLD-MCNC: 14.8 G/DL (ref 12–16)
IMM GRANULOCYTES # BLD AUTO: 0.03 K/UL (ref 0–0.11)
IMM GRANULOCYTES NFR BLD AUTO: 0.4 % (ref 0–0.9)
KETONES UR STRIP.AUTO-MCNC: ABNORMAL MG/DL
LEUKOCYTE ESTERASE UR QL STRIP.AUTO: NEGATIVE
LIPASE SERPL-CCNC: 22 U/L (ref 11–82)
LYMPHOCYTES # BLD AUTO: 2.46 K/UL (ref 1–4.8)
LYMPHOCYTES NFR BLD: 29.1 % (ref 22–41)
MCH RBC QN AUTO: 32.2 PG (ref 27–33)
MCHC RBC AUTO-ENTMCNC: 33.4 G/DL (ref 33.6–35)
MCV RBC AUTO: 96.5 FL (ref 81.4–97.8)
MICRO URNS: ABNORMAL
MONOCYTES # BLD AUTO: 0.55 K/UL (ref 0–0.85)
MONOCYTES NFR BLD AUTO: 6.5 % (ref 0–13.4)
NEUTROPHILS # BLD AUTO: 5.11 K/UL (ref 2–7.15)
NEUTROPHILS NFR BLD: 60.5 % (ref 44–72)
NITRITE UR QL STRIP.AUTO: NEGATIVE
NRBC # BLD AUTO: 0 K/UL
NRBC BLD-RTO: 0 /100 WBC
PH UR STRIP.AUTO: 6 [PH] (ref 5–8)
PLATELET # BLD AUTO: 248 K/UL (ref 164–446)
PMV BLD AUTO: 10.5 FL (ref 9–12.9)
POTASSIUM SERPL-SCNC: 4.2 MMOL/L (ref 3.6–5.5)
PROT SERPL-MCNC: 7.3 G/DL (ref 6–8.2)
PROT UR QL STRIP: NEGATIVE MG/DL
RBC # BLD AUTO: 4.59 M/UL (ref 4.2–5.4)
RBC UR QL AUTO: NEGATIVE
SODIUM SERPL-SCNC: 141 MMOL/L (ref 135–145)
SP GR UR STRIP.AUTO: 1.01
TROPONIN T SERPL-MCNC: <6 NG/L (ref 6–19)
UROBILINOGEN UR STRIP.AUTO-MCNC: 0.2 MG/DL
WBC # BLD AUTO: 8.5 K/UL (ref 4.8–10.8)

## 2021-07-27 PROCEDURE — 71045 X-RAY EXAM CHEST 1 VIEW: CPT

## 2021-07-27 PROCEDURE — 93005 ELECTROCARDIOGRAM TRACING: CPT | Performed by: EMERGENCY MEDICINE

## 2021-07-27 PROCEDURE — 85025 COMPLETE CBC W/AUTO DIFF WBC: CPT

## 2021-07-27 PROCEDURE — 84484 ASSAY OF TROPONIN QUANT: CPT

## 2021-07-27 PROCEDURE — 80053 COMPREHEN METABOLIC PANEL: CPT

## 2021-07-27 PROCEDURE — 700111 HCHG RX REV CODE 636 W/ 250 OVERRIDE (IP): Performed by: EMERGENCY MEDICINE

## 2021-07-27 PROCEDURE — 96374 THER/PROPH/DIAG INJ IV PUSH: CPT

## 2021-07-27 PROCEDURE — 83690 ASSAY OF LIPASE: CPT

## 2021-07-27 PROCEDURE — 99285 EMERGENCY DEPT VISIT HI MDM: CPT

## 2021-07-27 PROCEDURE — 81003 URINALYSIS AUTO W/O SCOPE: CPT

## 2021-07-27 PROCEDURE — 700117 HCHG RX CONTRAST REV CODE 255: Performed by: EMERGENCY MEDICINE

## 2021-07-27 PROCEDURE — 700105 HCHG RX REV CODE 258: Performed by: EMERGENCY MEDICINE

## 2021-07-27 PROCEDURE — 96375 TX/PRO/DX INJ NEW DRUG ADDON: CPT

## 2021-07-27 PROCEDURE — 74177 CT ABD & PELVIS W/CONTRAST: CPT

## 2021-07-27 RX ORDER — SODIUM CHLORIDE, SODIUM LACTATE, POTASSIUM CHLORIDE, CALCIUM CHLORIDE 600; 310; 30; 20 MG/100ML; MG/100ML; MG/100ML; MG/100ML
1000 INJECTION, SOLUTION INTRAVENOUS ONCE
Status: COMPLETED | OUTPATIENT
Start: 2021-07-27 | End: 2021-07-27

## 2021-07-27 RX ORDER — OMEPRAZOLE 40 MG/1
40 CAPSULE, DELAYED RELEASE ORAL DAILY
Qty: 14 CAPSULE | Refills: 0 | Status: SHIPPED | OUTPATIENT
Start: 2021-07-27 | End: 2021-08-10

## 2021-07-27 RX ORDER — BISACODYL 10 MG
10 SUPPOSITORY, RECTAL RECTAL PRN
Qty: 7 SUPPOSITORY | Refills: 0 | Status: SHIPPED | OUTPATIENT
Start: 2021-07-27 | End: 2021-08-03

## 2021-07-27 RX ORDER — ONDANSETRON 2 MG/ML
4 INJECTION INTRAMUSCULAR; INTRAVENOUS ONCE
Status: COMPLETED | OUTPATIENT
Start: 2021-07-27 | End: 2021-07-27

## 2021-07-27 RX ORDER — POLYETHYLENE GLYCOL 3350 17 G/17G
17 POWDER, FOR SOLUTION ORAL DAILY
Qty: 7 EACH | Refills: 0 | Status: SHIPPED | OUTPATIENT
Start: 2021-07-27 | End: 2021-08-03

## 2021-07-27 RX ADMIN — IOHEXOL 100 ML: 350 INJECTION, SOLUTION INTRAVENOUS at 21:03

## 2021-07-27 RX ADMIN — FAMOTIDINE 20 MG: 10 INJECTION INTRAVENOUS at 18:17

## 2021-07-27 RX ADMIN — SODIUM CHLORIDE, POTASSIUM CHLORIDE, SODIUM LACTATE AND CALCIUM CHLORIDE 1000 ML: 600; 310; 30; 20 INJECTION, SOLUTION INTRAVENOUS at 18:17

## 2021-07-27 RX ADMIN — ONDANSETRON 4 MG: 2 INJECTION INTRAMUSCULAR; INTRAVENOUS at 18:17

## 2021-07-27 ASSESSMENT — LIFESTYLE VARIABLES: DO YOU DRINK ALCOHOL: NO

## 2021-07-27 ASSESSMENT — FIBROSIS 4 INDEX: FIB4 SCORE: 0.85

## 2021-07-27 NOTE — ED TRIAGE NOTES
47 y/o female ambulatory to triage with c/o abd pain, nausea, bloating and constipation. Pt states she has had the pain for aprox 3 months but that it has increased over the past 10 days.

## 2021-07-27 NOTE — Clinical Note
Cindy Yung was seen and treated in our emergency department on 7/27/2021.  She may return to work on 07/29/2021.       If you have any questions or concerns, please don't hesitate to call.      Harman Ramachandran M.D.

## 2021-07-27 NOTE — ED TRIAGE NOTES
"Vitals rechecked in TRG and documented per Epic. Pt denied any new need at this time, pt updated on wait times,thanked for patients. Pt informed to alert TRG Tech or TRG nurse with any needs and/or changes in condition, pt verbalized understanding. Pt states \"px is the same\"  "

## 2021-07-28 NOTE — ED NOTES
Discharge instructions discussed with pt, verbalizes understanding. PIV removed. All belongings with pt upon leaving unit. Pt amb to lobby with steady gait.

## 2021-07-28 NOTE — PROGRESS NOTES
Left voicemail regarding lab results which are within normal limits.  Patient also had a normal x-ray showing no constipation yesterday.  I do not recommend enema him for symptomatic relief of her abdominal pain and feelings of bloating and heaviness.  Patient does have a follow-up appointment scheduled with her primary care provider on Thursday July 29 of 2021.  I do recommend that patient proceed to emergency department if she is having increasing of her abdominal pain since her urgent care visit yesterday.  Patient needs further evaluation if her symptoms are persisting and worsening.  Please return call for questions or concerns.

## 2021-07-28 NOTE — DISCHARGE INSTRUCTIONS
You were seen and evaluated in the Emergency Department at [facility] for:     Abdominal pain and chest pain and nausea and constipation.    You had the following tests and studies:    Thankfully work-up today is reassuring.  Your blood counts and urine tests and EKG and imaging are all stable.    You received the following medications:    IV ondansetron, famotidine, and fluids.    You received the following prescriptions:    Omeprazole and bisacodyl suppositories and MiraLAX.  Take as prescribed.    Consider cessation of all cannabis use.  ----------------------------    Please make sure to follow up with:    Primary care provider and GI as soon as possible for recheck and definitive care, but if you have any worsening pain or fevers or vomiting or trouble breathing or any other concerning symptoms come back to the hospital immediately.    Good luck, we hope you get better soon!  ----------------------------    We always encourage patients to return IMMEDIATELY if they have:  Increased or changing pain, passing out, fevers over 100.4 (taken in your mouth or rectally) for more than 2 days, redness or swelling of skin or tissues, feeling like your heart is beating fast, chest pain that is new or worsening, trouble breathing, feeling like your throat is closing up and can not breath, inability to walk, weakness of any part of your body, new dizziness, severe bleeding that won't stop from any part of your body, if you can't eat or drink, or if you have any other concerns.   If you feel worse, please know that you can always return with any questions, concerns, worse symptoms, or you are feeling unsafe. We certainly cannot say for sure that we have ruled out every illness or dangerous disease, but we feel that at this specific time, your exam, tests, and vital signs like heart rate and blood pressure are safe for discharge.

## 2021-07-28 NOTE — ED PROVIDER NOTES
ED Provider Note    CHIEF COMPLAINT  Chief Complaint   Patient presents with   • Abdominal Pain   • Bloated   • Constipation   • Nausea       HPI    Primary care provider: Zaida Leonardo P.A.-C.  Means of arrival: POV/Walk-in  History obtained from: Patient and   History limited by: Nothing    Cindy Yung is a 46 y.o. female who presents with abdominal pain.  On and off for the last 3 months but worse over the last 3 days.  Associated with bloating and constipation.  No vomiting but she does have nausea.  No aggravating or alleviating factors noted.  No prior episodes like this.  Daughter did have C. difficile several months ago, but the patient denies any diarrhea or fevers.  Associated symptoms do include some intermittent chest pain described as anterior chest pain sharp and achy intermittent worsened when her abdominal pain worsens.  Abdominal pain is described as sharp and feels like there is a mass in her abdomen and sometimes feels her abdomen pulsating, mostly around her umbilicus but also to the left lower quadrant and to the right lower quadrant.  No falls or injuries or trauma.  She had an abnormal colonoscopy approximately 8 years ago, but was cleared by GI she had some slightly atypical polyps, and is due for another colonoscopy in 2 years.  No black or bloody output.  She has occasional sweats at night but she has had all of her female pelvic organs removed, no increase in night sweats.  She has lost about 10 pounds over the last 3 months unintentionally.  She does report increased anxiety lately, but no thoughts of self-harm or abuse.  Only daily meds are vitamins and supplements.  She has had a tummy tuck many years ago.  She tried an enema without relief, contacted PCP, was seen by urgent care recently had reassuring x-ray and labs but symptoms have continued to worsen so she was recommended by PCP to come to the ER for further work-up.  Last oral intake was some broth at  around 11 AM.    REVIEW OF SYSTEMS  Constitutional: Positive for occasional night sweats and some weight loss over the last 3 months.  HENT: Negative for rhinorrhea or sore throat.    Eyes: Negative for redness or discharge.  Respiratory: Negative for cough or shortness of breath.    Cardiovascular: Positive for intermittent chest pain no syncope.  Gastrointestinal: Positive for nausea and abdominal pain, no vomiting or diarrhea.  Positive for constipation as well.  Genitourinary: Negative for dysuria but positive for left-sided flank pain.  Musculoskeletal: Negative for extremity pain, does report some intermittent bilateral lower extremity swelling.  Skin: Negative for itching or rash.   Psychiatric/Behavioral: Positive for increased anxiety, no thoughts of self-harm.  See HPI for further details. All other systems are negative.     PAST MEDICAL HISTORY   has a past medical history of Asthma and Depression.    PAST FAMILY HISTORY  Family History   Problem Relation Age of Onset   • Diabetes Father         border-line   • Hypertension Father    • Thyroid Sister    • Psychiatric Illness Sister         anxiety       SOCIAL HISTORY  Social History     Tobacco Use   • Smoking status: Never Smoker   • Smokeless tobacco: Never Used   Vaping Use   • Vaping Use: Never used   Substance and Sexual Activity   • Alcohol use: Yes     Comment: occ   • Drug use: Yes     Types: Marijuana, Inhaled     Comment: Vapes marijuana when can't sleep   • Sexual activity: Yes     Partners: Male     Comment: ; three kids; 24,22, 21       SURGICAL HISTORY   has a past surgical history that includes tumor excision with biopsy; abdominal hysterectomy total; and colostomy.    CURRENT MEDICATIONS  Home Medications     Reviewed by Simon Caldwell R.N. (Registered Nurse) on 07/27/21 at 1434  Med List Status: Partial   Medication Last Dose Status   albuterol 108 (90 Base) MCG/ACT Aero Soln inhalation aerosol  Active   montelukast (SINGULAIR)  "10 MG Tab  Active                ALLERGIES  Allergies   Allergen Reactions   • Benadryl [Diphenhydramine]        PHYSICAL EXAM  VITAL SIGNS: /61   Pulse (!) 55   Temp 36.7 °C (98 °F) (Temporal)   Resp 16   Ht 1.6 m (5' 3\")   Wt 59.3 kg (130 lb 11.7 oz)   SpO2 97%   BMI 23.16 kg/m²    Pulse ox interpretation: On room air, I interpret this pulse ox as normal.  Constitutional: Well-developed, well-nourished. Sitting up in mild distress.   HEENT: Normocephalic, atraumatic. Posterior pharynx clear, mucous membranes dry.  Eyes:  EOMI. Normal sclerae.  Neck: Supple, nontender.  Chest/Pulmonary: Clear to ausculation bilaterally, no wheezes or rhonchi.  Cardiovascular: Regular rate and rhythm, no murmur.   Abdomen: Soft, nontender; no rebound, guarding, or masses.  Back: No CVA or midline tenderness.   Musculoskeletal: No deformity or edema.  Neuro: Clear speech, normal coordination, cranial nerves II-XII grossly intact, no focal asymmetry or sensory deficits.   Psych: Normal mood and affect.  Skin: No rashes, warm and dry.      DIAGNOSTIC STUDIES / PROCEDURES    LABS & EKG  Results for orders placed or performed during the hospital encounter of 07/27/21   CBC WITH DIFFERENTIAL   Result Value Ref Range    WBC 8.5 4.8 - 10.8 K/uL    RBC 4.59 4.20 - 5.40 M/uL    Hemoglobin 14.8 12.0 - 16.0 g/dL    Hematocrit 44.3 37.0 - 47.0 %    MCV 96.5 81.4 - 97.8 fL    MCH 32.2 27.0 - 33.0 pg    MCHC 33.4 (L) 33.6 - 35.0 g/dL    RDW 45.1 35.9 - 50.0 fL    Platelet Count 248 164 - 446 K/uL    MPV 10.5 9.0 - 12.9 fL    Neutrophils-Polys 60.50 44.00 - 72.00 %    Lymphocytes 29.10 22.00 - 41.00 %    Monocytes 6.50 0.00 - 13.40 %    Eosinophils 1.50 0.00 - 6.90 %    Basophils 2.00 (H) 0.00 - 1.80 %    Immature Granulocytes 0.40 0.00 - 0.90 %    Nucleated RBC 0.00 /100 WBC    Neutrophils (Absolute) 5.11 2.00 - 7.15 K/uL    Lymphs (Absolute) 2.46 1.00 - 4.80 K/uL    Monos (Absolute) 0.55 0.00 - 0.85 K/uL    Eos (Absolute) 0.13 0.00 - " 0.51 K/uL    Baso (Absolute) 0.17 (H) 0.00 - 0.12 K/uL    Immature Granulocytes (abs) 0.03 0.00 - 0.11 K/uL    NRBC (Absolute) 0.00 K/uL   COMP METABOLIC PANEL   Result Value Ref Range    Sodium 141 135 - 145 mmol/L    Potassium 4.2 3.6 - 5.5 mmol/L    Chloride 103 96 - 112 mmol/L    Co2 26 20 - 33 mmol/L    Anion Gap 12.0 7.0 - 16.0    Glucose 83 65 - 99 mg/dL    Bun 11 8 - 22 mg/dL    Creatinine 0.69 0.50 - 1.40 mg/dL    Calcium 9.3 8.5 - 10.5 mg/dL    AST(SGOT) 19 12 - 45 U/L    ALT(SGPT) 14 2 - 50 U/L    Alkaline Phosphatase 51 30 - 99 U/L    Total Bilirubin 1.1 0.1 - 1.5 mg/dL    Albumin 4.6 3.2 - 4.9 g/dL    Total Protein 7.3 6.0 - 8.2 g/dL    Globulin 2.7 1.9 - 3.5 g/dL    A-G Ratio 1.7 g/dL   LIPASE   Result Value Ref Range    Lipase 22 11 - 82 U/L   URINALYSIS    Specimen: Blood   Result Value Ref Range    Color Yellow     Character Clear     Specific Gravity 1.011 <1.035    Ph 6.0 5.0 - 8.0    Glucose Negative Negative mg/dL    Ketones Trace (A) Negative mg/dL    Protein Negative Negative mg/dL    Bilirubin Negative Negative    Urobilinogen, Urine 0.2 Negative    Nitrite Negative Negative    Leukocyte Esterase Negative Negative    Occult Blood Negative Negative    Micro Urine Req see below    TROPONIN   Result Value Ref Range    Troponin T <6 6 - 19 ng/L   ESTIMATED GFR   Result Value Ref Range    GFR If African American >60 >60 mL/min/1.73 m 2    GFR If Non African American >60 >60 mL/min/1.73 m 2   EKG   Result Value Ref Range    Report       [facility] Emergency Dept.    Test Date:  2021  Pt Name:    RETA RODRIGEZ                 Department: ER  MRN:        [id number]                      Room:       [facility]  Gender:     Female                       Technician: [id number]  :        [id number]                   Requested By:HARMAN PHAN  Order #:    [id number]                    Roberto MD: Harman Phan MD    Measurements  Intervals                                Axis  Rate:        47                           P:          36  TX:         160                          QRS:        61  QRSD:       96                           T:          54  QT:         456  QTc:        404    Interpretive Statements  SINUS BRADYCARDIA  No previous ECG available for comparison  No STEMI, strain, or dysrhythmia  Electronically Signed On 7- 23:51:21 PDT by Harman Ramachandran MD         RADIOLOGY  CT-ABDOMEN-PELVIS WITH   Final Result      1.  Multiple indeterminate liver lesions as detailed above, most likely representing hemangiomas.   2.  No acute inflammatory abnormality in the abdomen or pelvis.      DX-CHEST-PORTABLE (1 VIEW)   Final Result      1.  There is no acute cardiopulmonary process.          COURSE & MEDICAL DECISION MAKING    This is a 46 y.o. female who presents with abdominal pain on and off for the last 3 months but worse over the last several days, associated with nausea and constipation.    Differential Diagnosis includes but is not limited to:  Constipation, obstruction, ileus, malignancy, diverticulitis, kidney stone, less likely ACS    ED Course:  46-year-old female with above complaints.  She reports a history of an abnormal colonoscopy years ago, she has stool changes and some weight loss and some night sweats we would like to screen for malignancy or any other acute surgical disease with a CT scan.  She will also be screened with labs, does not want anything for pain but is relieved that we are proceeding with a appropriate broad work-up.  She does report some nausea and looks dehydrated I will keep n.p.o. until surgical process is ruled out and treat with famotidine, Zofran, and IV fluids.    Thankfully, work-up today is quite reassuring.  White count normal no fevers.  Electrolytes are stable no acidosis.  No critical anemia.  LFTs and lipase reassuring.  No markers of infection on urinalysis, there are ketones present concordant with my suspicion for dehydration.  Troponin  negative EKG reassuring heart score at most 2 highly doubt ACS.  Thankfully nothing acute on CT scan, there are some hemangiomas in the liver.    On recheck patient is feeling better.  No active vomiting.  Pain improved.  Vital signs stable.  Relieved with reassuring work-up but also frustrated without clear etiology.  Discussed with the patient and  at length, perhaps she is getting early peptic ulcer disease, she does have some increased stress lately so I will give her a proton pump inhibitor.  Regarding her constipation there is no signs of obstruction so I will give her bisacodyl suppositories and MiraLAX to take at home.  She does use cannabis most days, I asked that she try and abstain from this because because that could be contributing to her symptoms.  She is already established with a gastroenterologist, but I asked that she follow-up with them as soon as possible for possible upper and lower endoscopies.  If she has any new or worsening symptoms she will seek emergent reevaluation.  She has known about other hemangiomas in her liver and she will have this follow-up with her PCP in 6 to 12 months with ultrasound.    Medications   famotidine (PEPCID) injection 20 mg (20 mg Intravenous Given 7/27/21 1817)   ondansetron (ZOFRAN) syringe/vial injection 4 mg (4 mg Intravenous Given 7/27/21 1817)   lactated ringers infusion (BOLUS) (0 mL Intravenous Stopped 7/27/21 2142)   iohexol (OMNIPAQUE) 350 mg/mL (100 mL Intravenous Given 7/27/21 2103)       FINAL IMPRESSION  1. Acute abdominal pain    2. Nausea    3. Other constipation    4. Acute chest pain    5. Hemangioma of liver    6. Anxiety    7. Depression, unspecified depression type    8. Dehydration    9. Episodic cannabis use        PRESCRIPTIONS  Discharge Medication List as of 7/27/2021  9:42 PM      START taking these medications    Details   omeprazole (PRILOSEC) 40 MG delayed-release capsule Take 1 capsule by mouth every day for 14 days., Disp-14  capsule, R-0, Normal      polyethylene glycol/lytes (MIRALAX) 17 g Pack Take 1 Packet by mouth every day for 7 days., Disp-7 Each, R-0, Normal      bisacodyl (DULCOLAX) 10 MG Suppos Insert 1 Suppository into the rectum as needed (constipation) for up to 7 days., Disp-7 Suppository, R-0, Normal             FOLLOW UP  [facility], Emergency Dept  [address] [address] [phone]  Today  If you have ANY new or worse symptoms!    Zaida Leonardo P.A.-C.  [address]  [address]  [place] [address] [phone]    Schedule an appointment as soon as possible for a visit in 2 days  for recheck and routine care    Robert Mora M.D.  [address]  [place] [address]  [phone]    Call in 1 day  for recheck with your GI doctor      -DISCHARGE-       Results, exam findings, clinical impression, presumed diagnosis, treatment options, and strict return precautions were discussed with the patient, and they verbalized understanding, agreed with, and appreciated the plan of care.    Pertinent Labs & Imaging studies reviewed and verified by myself, as well as nursing notes and the patient's past medical, family, and social histories (See chart for details).    Portions of this record were made with voice recognition software.  Despite my review, spelling/grammar/context errors may still remain.  Interpretation of this chart should be taken in this context.    Electronically signed by Harman Ramachandran M.D. on 7/27/2021 at 11:53 PM.

## 2021-07-28 NOTE — TELEPHONE ENCOUNTER
"----- Message from Candy Santana, Med Ass't sent at 7/27/2021  4:44 PM PDT -----  Regarding: FW: Test Result Question  Contact: [phone]    ----- Message -----  From: Jeanine Sagastume, Med Ass't  Sent: 7/27/2021   4:18 PM PDT  To: Nicolasa Poon P.A.-C., [facility]  Subject: FW: Test Result Question                           ----- Message -----  From: Cindy Yung  Sent: 7/27/2021   1:21 PM PDT  To: Mychart [facility] Pool  Subject: Test Result Question                             I'm still in pain and feeling \"heavy\"and weak. Can you run more tests or is it okay to go to the ER?    I don't think I will die in the next hour but I'm miserable and it's getting worse. I am trying to finish normal work things and I can't think straight.     Anitra Leonardo      "

## 2021-10-21 ENCOUNTER — HOSPITAL ENCOUNTER (OUTPATIENT)
Dept: LAB | Facility: MEDICAL CENTER | Age: 46
End: 2021-10-21
Attending: INTERNAL MEDICINE
Payer: COMMERCIAL

## 2021-10-21 PROCEDURE — 36415 COLL VENOUS BLD VENIPUNCTURE: CPT

## 2021-10-21 PROCEDURE — 86480 TB TEST CELL IMMUN MEASURE: CPT

## 2021-10-25 LAB
GAMMA INTERFERON BACKGROUND BLD IA-ACNC: 0.04 IU/ML
M TB IFN-G BLD-IMP: NEGATIVE
M TB IFN-G CD4+ BCKGRND COR BLD-ACNC: 0.04 IU/ML
MITOGEN IGNF BCKGRD COR BLD-ACNC: >10 IU/ML
QFT TB2 - NIL TBQ2: 0.02 IU/ML

## 2021-10-26 ENCOUNTER — HOSPITAL ENCOUNTER (OUTPATIENT)
Dept: LAB | Facility: MEDICAL CENTER | Age: 46
End: 2021-10-26
Attending: INTERNAL MEDICINE
Payer: COMMERCIAL

## 2021-10-26 LAB — CORTIS SERPL-MCNC: 8.9 UG/DL (ref 0–23)

## 2021-10-26 PROCEDURE — 36415 COLL VENOUS BLD VENIPUNCTURE: CPT

## 2021-10-26 PROCEDURE — 82533 TOTAL CORTISOL: CPT

## 2021-12-21 ENCOUNTER — TELEPHONE (OUTPATIENT)
Dept: CARDIOLOGY | Facility: MEDICAL CENTER | Age: 46
End: 2021-12-21

## 2021-12-21 NOTE — TELEPHONE ENCOUNTER
Called patient. She had ultrasound of aortic and abdomen in October.    Requested reports.    Also she will have another ultrasound tomorrow CT and angio. Will call later to get these reports.

## 2021-12-28 ENCOUNTER — TELEPHONE (OUTPATIENT)
Dept: CARDIOLOGY | Facility: MEDICAL CENTER | Age: 46
End: 2021-12-28

## 2022-01-03 ENCOUNTER — TELEPHONE (OUTPATIENT)
Dept: CARDIOLOGY | Facility: MEDICAL CENTER | Age: 47
End: 2022-01-03

## 2022-01-03 ENCOUNTER — OFFICE VISIT (OUTPATIENT)
Dept: CARDIOLOGY | Facility: MEDICAL CENTER | Age: 47
End: 2022-01-03
Payer: COMMERCIAL

## 2022-01-03 VITALS
WEIGHT: 139.4 LBS | OXYGEN SATURATION: 98 % | HEART RATE: 82 BPM | RESPIRATION RATE: 14 BRPM | SYSTOLIC BLOOD PRESSURE: 120 MMHG | DIASTOLIC BLOOD PRESSURE: 78 MMHG | BODY MASS INDEX: 23.8 KG/M2 | HEIGHT: 64 IN

## 2022-01-03 DIAGNOSIS — R10.9 ABDOMINAL PAIN, UNSPECIFIED ABDOMINAL LOCATION: ICD-10-CM

## 2022-01-03 DIAGNOSIS — R01.1 UNDIAGNOSED CARDIAC MURMURS: ICD-10-CM

## 2022-01-03 PROCEDURE — 99204 OFFICE O/P NEW MOD 45 MIN: CPT | Performed by: INTERNAL MEDICINE

## 2022-01-03 RX ORDER — OMEPRAZOLE 20 MG/1
20 CAPSULE, DELAYED RELEASE ORAL DAILY
COMMUNITY
End: 2022-05-16

## 2022-01-03 ASSESSMENT — FIBROSIS 4 INDEX: FIB4 SCORE: 0.96

## 2022-01-03 NOTE — PROGRESS NOTES
Chief Complaint   Patient presents with   • New Patient     F/V Dx: Atherosclerosis of aorta       Subjective     Arcelia Yung is a 46 y.o. female who presents today for initial consultation regarding abdominal discomfort.  She has a history since April 2021 of point tenderness on several areas of her abdomen not particularly postprandial and present most of the time.  She has been undergoing an extensive evaluation by gastroenterology.  She presents after a finding of mild abdominal aortic atherosclerosis on one of her abdominal CT scans also noted to be present on a second scan.  Normal caliber abdominal aorta.  She indicates there were some findings of possible narrowing on another scan which is not available currently.  She is a former avid exerciser who has not been exercising recently due to her symptomology and has lost weight unintentionally.  She has no chest discomfort cardiac symptoms.  She has no apparent vascular symptomology upon discussion.  Does not smoke drink excessively or do drugs.  Not on cholesterol therapy.    Past Medical History:   Diagnosis Date   • Asthma    • Depression      Past Surgical History:   Procedure Laterality Date   • ABDOMINAL HYSTERECTOMY TOTAL     • COLOSTOMY     • TUMOR EXCISION WITH BIOPSY      stomach - benign     Family History   Problem Relation Age of Onset   • Diabetes Father         border-line   • Hypertension Father    • Thyroid Sister    • Psychiatric Illness Sister         anxiety     Social History     Socioeconomic History   • Marital status:      Spouse name: Not on file   • Number of children: Not on file   • Years of education: Not on file   • Highest education level: Not on file   Occupational History   • Not on file   Tobacco Use   • Smoking status: Never Smoker   • Smokeless tobacco: Never Used   Vaping Use   • Vaping Use: Never used   Substance and Sexual Activity   • Alcohol use: Yes     Comment: occ   • Drug use: Yes     Types:  Marijuana, Inhaled     Comment: Vapes marijuana when can't sleep   • Sexual activity: Yes     Partners: Male     Comment: ; three kids; 24,22, 21   Other Topics Concern   • Not on file   Social History Narrative   • Not on file     Social Determinants of Health     Financial Resource Strain:    • Difficulty of Paying Living Expenses: Not on file   Food Insecurity:    • Worried About Running Out of Food in the Last Year: Not on file   • Ran Out of Food in the Last Year: Not on file   Transportation Needs:    • Lack of Transportation (Medical): Not on file   • Lack of Transportation (Non-Medical): Not on file   Physical Activity:    • Days of Exercise per Week: Not on file   • Minutes of Exercise per Session: Not on file   Stress:    • Feeling of Stress : Not on file   Social Connections:    • Frequency of Communication with Friends and Family: Not on file   • Frequency of Social Gatherings with Friends and Family: Not on file   • Attends Latter day Services: Not on file   • Active Member of Clubs or Organizations: Not on file   • Attends Club or Organization Meetings: Not on file   • Marital Status: Not on file   Intimate Partner Violence:    • Fear of Current or Ex-Partner: Not on file   • Emotionally Abused: Not on file   • Physically Abused: Not on file   • Sexually Abused: Not on file   Housing Stability:    • Unable to Pay for Housing in the Last Year: Not on file   • Number of Places Lived in the Last Year: Not on file   • Unstable Housing in the Last Year: Not on file     Allergies   Allergen Reactions   • Benadryl [Diphenhydramine]      Outpatient Encounter Medications as of 1/3/2022   Medication Sig Dispense Refill   • omeprazole (PRILOSEC) 20 MG delayed-release capsule Take 20 mg by mouth every day.     • montelukast (SINGULAIR) 10 MG Tab TAKE 1 TABLET BY MOUTH EVERY DAY 90 Tab 2   • albuterol 108 (90 Base) MCG/ACT Aero Soln inhalation aerosol INHALE 2 PUFFS BY MOUTH EVERY 6 HOURS AS NEEDED FOR  "SHORTNESS OF BREATH 8.5 g 3     No facility-administered encounter medications on file as of 1/3/2022.     Review of Systems   All other systems reviewed and are negative.             Objective     /78 (BP Location: Left arm, Patient Position: Sitting, BP Cuff Size: Adult)   Pulse 82   Resp 14   Ht 1.613 m (5' 3.5\")   Wt 63.2 kg (139 lb 6.4 oz)   SpO2 98%   BMI 24.31 kg/m²     Physical Exam  Vitals reviewed.   Constitutional:       General: She is not in acute distress.     Appearance: She is well-developed. She is not diaphoretic.   HENT:      Head: Normocephalic and atraumatic.      Right Ear: External ear normal.      Left Ear: External ear normal.      Mouth/Throat:      Pharynx: No oropharyngeal exudate.   Eyes:      General: No scleral icterus.        Right eye: No discharge.         Left eye: No discharge.      Conjunctiva/sclera: Conjunctivae normal.      Pupils: Pupils are equal, round, and reactive to light.   Neck:      Thyroid: No thyromegaly.      Vascular: No JVD.      Trachea: No tracheal deviation.   Cardiovascular:      Rate and Rhythm: Normal rate and regular rhythm.      Chest Wall: PMI is not displaced.      Pulses:           Carotid pulses are 2+ on the right side and 2+ on the left side.       Radial pulses are 2+ on the right side and 2+ on the left side.        Popliteal pulses are 2+ on the right side and 2+ on the left side.        Dorsalis pedis pulses are 2+ on the right side and 2+ on the left side.        Posterior tibial pulses are 2+ on the right side and 2+ on the left side.      Heart sounds: S1 normal and S2 normal. Murmur ( Soft 1/6 systolic) heard.   No friction rub. No gallop. No S3 or S4 sounds.    Pulmonary:      Effort: Pulmonary effort is normal. No respiratory distress.      Breath sounds: Normal breath sounds. No wheezing or rales.   Chest:      Chest wall: No tenderness.   Abdominal:      General: Bowel sounds are normal. There is no distension.      " Palpations: Abdomen is soft.      Tenderness: There is no abdominal tenderness.   Musculoskeletal:         General: No tenderness. Normal range of motion.      Cervical back: Normal range of motion and neck supple.   Skin:     General: Skin is warm and dry.      Findings: No erythema or rash.   Neurological:      Mental Status: She is alert and oriented to person, place, and time.      Cranial Nerves: No cranial nerve deficit (Cranial nerves II through XII grossly intact).   Psychiatric:         Behavior: Behavior normal.         Thought Content: Thought content normal.         Judgment: Judgment normal.       LABS:  Lab Results   Component Value Date/Time    CHOLSTRLTOT 192 08/14/2020 08:01 AM     (H) 08/14/2020 08:01 AM    HDL 71 08/14/2020 08:01 AM    TRIGLYCERIDE 57 08/14/2020 08:01 AM       Lab Results   Component Value Date/Time    WBC 8.5 07/27/2021 05:26 PM    WBC 6.5 08/27/2010 12:45 PM    RBC 4.59 07/27/2021 05:26 PM    RBC 4.27 08/27/2010 12:45 PM    HEMOGLOBIN 14.8 07/27/2021 05:26 PM    HEMATOCRIT 44.3 07/27/2021 05:26 PM    MCV 96.5 07/27/2021 05:26 PM    MCV 92 08/27/2010 12:45 PM    NEUTSPOLYS 60.50 07/27/2021 05:26 PM    LYMPHOCYTES 29.10 07/27/2021 05:26 PM    MONOCYTES 6.50 07/27/2021 05:26 PM    EOSINOPHILS 1.50 07/27/2021 05:26 PM    BASOPHILS 2.00 (H) 07/27/2021 05:26 PM     Lab Results   Component Value Date/Time    SODIUM 141 07/27/2021 05:26 PM    POTASSIUM 4.2 07/27/2021 05:26 PM    CHLORIDE 103 07/27/2021 05:26 PM    CO2 26 07/27/2021 05:26 PM    GLUCOSE 83 07/27/2021 05:26 PM    BUN 11 07/27/2021 05:26 PM    CREATININE 0.69 07/27/2021 05:26 PM    CREATININE 0.88 08/23/2010 11:05 AM    BUNCREATRAT 9 08/23/2010 11:05 AM    GLOMRATE >59 08/23/2010 11:05 AM         Lab Results   Component Value Date/Time    ALKPHOSPHAT 51 07/27/2021 05:26 PM    ASTSGOT 19 07/27/2021 05:26 PM    ALTSGPT 14 07/27/2021 05:26 PM    TBILIRUBIN 1.1 07/27/2021 05:26 PM      No results found for: BNPBTYPENAT    Lab Results   Component Value Date/Time    TSH 0.691 08/27/2010 12:45 PM     No results found for: PROTHROMBTM, INR     CT abdomen pelvis reviewed as above located in media           Assessment & Plan     1. Undiagnosed cardiac murmurs  EC-ECHOCARDIOGRAM COMPLETE W/O CONT   2. Abdominal pain, unspecified abdominal location         Medical Decision Making: Today's Assessment/Status/Plan:        Abdominal symptoms do not appear to be vascular in nature.  She has mild nonobstructive atherosclerosis of the abdominal aorta which is normal caliber per imaging.  We discussed atherosclerosis and the need for cholesterol therapy to slow or arrest its progression.  We discussed that it is unlikely because any of her current symptoms.  She does indicate there was narrowing on another test and it appears she had a CT mesenteric artery scan.  These records are unavailable and will seek them from [facility] and which was requested today.  Regardless her symptom complex does not fit with mesenteric ischemia and it is therefore unlikely that any atherosclerotic disease notable would be in need of therapy outside of cholesterol management.  Do recommend an echocardiogram as she has a very faint but detectable murmur and to ensure she does not have any underlying cardiac issues.  She had a normal EKG 7/27/2021 showing sinus rhythm/bradycardia with no other abnormalities at 47 bpm.    I do recommend LDL less than 70.  She will try dietary dimensions and given her abdominal issues I do not wish to cloud the diagnosis with the addition of a statin at this time but do recommend initiation when she has her nonvascular abdominal issues more fully addressed within the next year or 2.  She will follow-up in 6 to 12 months if her echocardiogram is normal.

## 2022-01-03 NOTE — TELEPHONE ENCOUNTER
Requested records from [facility] [phone]. Records to be faxed to [phone].

## 2022-01-13 ENCOUNTER — HOSPITAL ENCOUNTER (OUTPATIENT)
Dept: CARDIOLOGY | Facility: MEDICAL CENTER | Age: 47
End: 2022-01-13
Attending: INTERNAL MEDICINE
Payer: COMMERCIAL

## 2022-01-13 DIAGNOSIS — R01.1 UNDIAGNOSED CARDIAC MURMURS: ICD-10-CM

## 2022-01-13 PROCEDURE — 93306 TTE W/DOPPLER COMPLETE: CPT

## 2022-01-14 LAB
LV EJECT FRACT  99904: 60
LV EJECT FRACT MOD 2C 99903: 75.33
LV EJECT FRACT MOD 4C 99902: 62.01
LV EJECT FRACT MOD BP 99901: 65.22

## 2022-01-14 PROCEDURE — 93306 TTE W/DOPPLER COMPLETE: CPT | Mod: 26 | Performed by: INTERNAL MEDICINE

## 2022-01-31 ENCOUNTER — PATIENT MESSAGE (OUTPATIENT)
Dept: CARDIOLOGY | Facility: MEDICAL CENTER | Age: 47
End: 2022-01-31

## 2022-02-08 NOTE — PATIENT COMMUNICATION
Per TW last OV notes 01/03/22:    I do recommend LDL less than 70.  She will try dietary dimensions and given her abdominal issues I do not wish to cloud the diagnosis with the addition of a statin at this time but do recommend initiation when she has her nonvascular abdominal issues more fully addressed within the next year or 2.  She will follow-up in 6 to 12 months if her echocardiogram is normal.

## 2022-02-16 ENCOUNTER — HOSPITAL ENCOUNTER (OUTPATIENT)
Facility: MEDICAL CENTER | Age: 47
End: 2022-02-16
Attending: PATHOLOGY
Payer: COMMERCIAL

## 2022-02-16 DIAGNOSIS — Z00.6 RESEARCH STUDY PATIENT: ICD-10-CM

## 2022-02-18 LAB
ELF SCORE: 7.7
RELATIVE RISK: NORMAL
RISK GROUP: NORMAL
RISK: 3.3 %

## 2022-05-06 ENCOUNTER — PATIENT MESSAGE (OUTPATIENT)
Dept: CARDIOLOGY | Facility: MEDICAL CENTER | Age: 47
End: 2022-05-06
Payer: COMMERCIAL

## 2022-05-12 NOTE — PATIENT COMMUNICATION
Juan Cervantes M.D.  You 18 hours ago (3:37 PM)     I do not have an answer at the moment.  We never received the more recent mesenteric artery CT scan from [facility] results that she indicates had a problem.  Could we see if we can find these there is a CTA of the abdomen.  There is one in media from December or January but that 1 is separate from the lungs she indicates she had more recent.     Thanks     Medical records request sent to:    [facility]  [phone] P  [phone] F    Received receipt for confirmation.

## 2022-05-13 NOTE — PATIENT COMMUNICATION
Received most recent CTA Abdomen results from [facility] which is dated 12/22/21. These results are already in media dated 12/29/21. [employer] response sent to pt.

## 2022-05-16 ENCOUNTER — OFFICE VISIT (OUTPATIENT)
Dept: CARDIOLOGY | Facility: MEDICAL CENTER | Age: 47
End: 2022-05-16
Payer: COMMERCIAL

## 2022-05-16 VITALS
SYSTOLIC BLOOD PRESSURE: 128 MMHG | DIASTOLIC BLOOD PRESSURE: 70 MMHG | HEIGHT: 64 IN | RESPIRATION RATE: 16 BRPM | HEART RATE: 58 BPM | WEIGHT: 138 LBS | BODY MASS INDEX: 23.56 KG/M2 | OXYGEN SATURATION: 96 %

## 2022-05-16 DIAGNOSIS — B27.09 GAMMAHERPESVIRAL MONONUCLEOSIS WITH OTHER COMPLICATIONS: ICD-10-CM

## 2022-05-16 DIAGNOSIS — R10.9 ABDOMINAL PAIN, UNSPECIFIED ABDOMINAL LOCATION: ICD-10-CM

## 2022-05-16 PROCEDURE — 99214 OFFICE O/P EST MOD 30 MIN: CPT | Performed by: INTERNAL MEDICINE

## 2022-05-16 RX ORDER — CEFDINIR 300 MG/1
CAPSULE ORAL
COMMUNITY
Start: 2022-05-02 | End: 2022-05-16

## 2022-05-16 RX ORDER — SULFAMETHOXAZOLE AND TRIMETHOPRIM 800; 160 MG/1; MG/1
1 TABLET ORAL 2 TIMES DAILY
COMMUNITY
Start: 2022-03-15 | End: 2022-05-16

## 2022-05-16 ASSESSMENT — ENCOUNTER SYMPTOMS
ABDOMINAL PAIN: 1
WEIGHT LOSS: 0
CLAUDICATION: 0
PALPITATIONS: 0
SHORTNESS OF BREATH: 0
DOUBLE VISION: 0
PND: 0
BLURRED VISION: 0
NAUSEA: 0
DEPRESSION: 0
ORTHOPNEA: 0
CHILLS: 0
MYALGIAS: 0
COUGH: 0
BLOOD IN STOOL: 0
DIZZINESS: 0
HALLUCINATIONS: 0
HEADACHES: 0
SENSORY CHANGE: 0
EYE PAIN: 0
VOMITING: 0
FEVER: 0
SPEECH CHANGE: 0
LOSS OF CONSCIOUSNESS: 0
BRUISES/BLEEDS EASILY: 0
EYE DISCHARGE: 0
FALLS: 0

## 2022-05-16 ASSESSMENT — FIBROSIS 4 INDEX: FIB4 SCORE: .982831663806979995

## 2022-05-16 NOTE — PROGRESS NOTES
Chief Complaint   Patient presents with   • Heart Murmur       Subjective     Arcelia Yung is a 47 y.o. female who presents today for cardiac care and evaluation due to concern for cardiac problems with recent diagnosis of mononucleosis.  Patient does have chronic abdominal pain.  She did have a CT angiogram of her abdomen which did not show any evidence of significant arterial disease.  Patient was seen by her primary cardiologist Dr. Ortega.    No chest pain, no shortness of breath.  She is very concerned about her abdominal pain however.  She also very concerned about the potential link between Linsey-Barr virus infection and cardiac conditions.    Past Medical History:   Diagnosis Date   • Asthma    • Depression      Past Surgical History:   Procedure Laterality Date   • ABDOMINAL HYSTERECTOMY TOTAL     • COLOSTOMY     • TUMOR EXCISION WITH BIOPSY      stomach - benign     Family History   Problem Relation Age of Onset   • Diabetes Father         border-line   • Hypertension Father    • Thyroid Sister    • Psychiatric Illness Sister         anxiety     Social History     Socioeconomic History   • Marital status:      Spouse name: Not on file   • Number of children: Not on file   • Years of education: Not on file   • Highest education level: Not on file   Occupational History   • Not on file   Tobacco Use   • Smoking status: Never Smoker   • Smokeless tobacco: Never Used   Vaping Use   • Vaping Use: Never used   Substance and Sexual Activity   • Alcohol use: Yes     Comment: occ   • Drug use: Yes     Types: Marijuana, Inhaled     Comment: Vapes marijuana when can't sleep   • Sexual activity: Yes     Partners: Male     Comment: ; three kids; 24,22, 21   Other Topics Concern   • Not on file   Social History Narrative   • Not on file     Social Determinants of Health     Financial Resource Strain: Not on file   Food Insecurity: Not on file   Transportation Needs: Not on file   Physical  Activity: Not on file   Stress: Not on file   Social Connections: Not on file   Intimate Partner Violence: Not on file   Housing Stability: Not on file     Allergies   Allergen Reactions   • Benadryl [Diphenhydramine]      Outpatient Encounter Medications as of 5/16/2022   Medication Sig Dispense Refill   • montelukast (SINGULAIR) 10 MG Tab TAKE 1 TABLET BY MOUTH EVERY DAY 90 Tab 2   • albuterol 108 (90 Base) MCG/ACT Aero Soln inhalation aerosol INHALE 2 PUFFS BY MOUTH EVERY 6 HOURS AS NEEDED FOR SHORTNESS OF BREATH 8.5 g 3   • [DISCONTINUED] cefdinir (OMNICEF) 300 MG Cap TAKE 1 CAPSULE BY MOUTH TWICE A DAY FOR 10 DAYS (Patient not taking: Reported on 5/16/2022)     • [DISCONTINUED] sulfamethoxazole-trimethoprim (BACTRIM DS) 800-160 MG tablet Take 1 Tablet by mouth 2 times a day. (Patient not taking: Reported on 5/16/2022)     • [DISCONTINUED] omeprazole (PRILOSEC) 20 MG delayed-release capsule Take 20 mg by mouth every day.       No facility-administered encounter medications on file as of 5/16/2022.     Review of Systems   Constitutional: Negative for chills, fever, malaise/fatigue and weight loss.   HENT: Negative for ear discharge, ear pain, hearing loss and nosebleeds.    Eyes: Negative for blurred vision, double vision, pain and discharge.   Respiratory: Negative for cough and shortness of breath.    Cardiovascular: Negative for chest pain, palpitations, orthopnea, claudication, leg swelling and PND.   Gastrointestinal: Positive for abdominal pain. Negative for blood in stool, melena, nausea and vomiting.   Genitourinary: Negative for dysuria and hematuria.   Musculoskeletal: Negative for falls, joint pain and myalgias.   Skin: Negative for itching and rash.   Neurological: Negative for dizziness, sensory change, speech change, loss of consciousness and headaches.   Endo/Heme/Allergies: Negative for environmental allergies. Does not bruise/bleed easily.   Psychiatric/Behavioral: Negative for depression,  "hallucinations and suicidal ideas.              Objective     /70 (BP Location: Left arm, Patient Position: Sitting, BP Cuff Size: Adult)   Pulse (!) 58   Resp 16   Ht 1.613 m (5' 3.5\")   Wt 62.6 kg (138 lb)   SpO2 96%   BMI 24.06 kg/m²     Physical Exam  Vitals and nursing note reviewed.   Constitutional:       General: She is not in acute distress.     Appearance: She is not diaphoretic.   HENT:      Head: Normocephalic and atraumatic.      Right Ear: External ear normal.      Left Ear: External ear normal.      Nose: No congestion or rhinorrhea.   Eyes:      General:         Right eye: No discharge.         Left eye: No discharge.   Neck:      Thyroid: No thyromegaly.      Vascular: No JVD.   Cardiovascular:      Rate and Rhythm: Normal rate and regular rhythm.      Pulses: Normal pulses.   Pulmonary:      Effort: No respiratory distress.   Abdominal:      General: There is no distension.      Tenderness: There is no abdominal tenderness.   Musculoskeletal:         General: No swelling or tenderness.      Right lower leg: No edema.      Left lower leg: No edema.   Skin:     General: Skin is warm and dry.   Neurological:      Mental Status: She is alert and oriented to person, place, and time.      Cranial Nerves: No cranial nerve deficit.   Psychiatric:         Behavior: Behavior normal.                Assessment & Plan     1. Abdominal pain, unspecified abdominal location     2. Gammaherpesviral mononucleosis with other complications         Medical Decision Making: Today's Assessment/Status/Plan:   At this time, I have reassured patient that at this time, her cardiac condition is stable.  I advised patient to further seek medical care from her primary care in terms of mononucleosis.  In terms of her abdominal pain, it is unrelated to cardiogenic cause at this time.  No evidence of ischemia seen on CT angiogram.  Therefore, no further testing is indicated.  Patient will resume care with her primary " cardiologist for further discussion of statin initiation for primary prevention.

## 2022-06-10 ENCOUNTER — TELEPHONE (OUTPATIENT)
Dept: CARDIOLOGY | Facility: MEDICAL CENTER | Age: 47
End: 2022-06-10
Payer: COMMERCIAL

## 2022-06-10 ENCOUNTER — PATIENT MESSAGE (OUTPATIENT)
Dept: CARDIOLOGY | Facility: MEDICAL CENTER | Age: 47
End: 2022-06-10
Payer: COMMERCIAL

## 2022-06-10 ENCOUNTER — HOSPITAL ENCOUNTER (OUTPATIENT)
Dept: RADIOLOGY | Facility: MEDICAL CENTER | Age: 47
End: 2022-06-10
Attending: FAMILY MEDICINE
Payer: COMMERCIAL

## 2022-06-10 DIAGNOSIS — Z12.31 VISIT FOR SCREENING MAMMOGRAM: ICD-10-CM

## 2022-06-10 PROCEDURE — 77063 BREAST TOMOSYNTHESIS BI: CPT

## 2022-06-10 NOTE — TELEPHONE ENCOUNTER
TT    Caller:  - Arcelia   Topic/issue: Arcelia would like a majo to discuss her med issues,  and perhaps needed medications.    Callback Number:   [phone]  (Before 2:00)  [phone]   (After 2:00)    Thank you,   Hellen BRENNAN

## 2022-12-02 ENCOUNTER — PRE-ADMISSION TESTING (OUTPATIENT)
Dept: ADMISSIONS | Facility: MEDICAL CENTER | Age: 47
End: 2022-12-02
Attending: OTOLARYNGOLOGY
Payer: COMMERCIAL

## 2022-12-02 RX ORDER — FLUTICASONE PROPIONATE AND SALMETEROL 250; 50 UG/1; UG/1
1 POWDER RESPIRATORY (INHALATION) NIGHTLY
COMMUNITY

## 2022-12-02 ASSESSMENT — FIBROSIS 4 INDEX: FIB4 SCORE: .982831663806979995

## 2022-12-02 NOTE — OR NURSING
Advised patient to discontinue cannabis sativa 48 hours prior to surgery date.  Patient stated that she had a discussion about this with Dr. Ann and he said that it was ok to continue.  [facility] anesthesia department notified.

## 2022-12-14 ENCOUNTER — HOSPITAL ENCOUNTER (OUTPATIENT)
Dept: RADIOLOGY | Facility: MEDICAL CENTER | Age: 47
End: 2022-12-14
Payer: COMMERCIAL

## 2022-12-15 ENCOUNTER — HOSPITAL ENCOUNTER (OUTPATIENT)
Facility: MEDICAL CENTER | Age: 47
End: 2022-12-15
Attending: OTOLARYNGOLOGY | Admitting: OTOLARYNGOLOGY
Payer: COMMERCIAL

## 2022-12-15 ENCOUNTER — ANESTHESIA EVENT (OUTPATIENT)
Dept: SURGERY | Facility: MEDICAL CENTER | Age: 47
End: 2022-12-15
Payer: COMMERCIAL

## 2022-12-15 ENCOUNTER — ANESTHESIA (OUTPATIENT)
Dept: SURGERY | Facility: MEDICAL CENTER | Age: 47
End: 2022-12-15
Payer: COMMERCIAL

## 2022-12-15 VITALS
SYSTOLIC BLOOD PRESSURE: 126 MMHG | TEMPERATURE: 97.2 F | OXYGEN SATURATION: 93 % | RESPIRATION RATE: 16 BRPM | DIASTOLIC BLOOD PRESSURE: 71 MMHG | WEIGHT: 136.02 LBS | HEART RATE: 81 BPM | BODY MASS INDEX: 23.22 KG/M2 | HEIGHT: 64 IN

## 2022-12-15 DIAGNOSIS — J32.0 MAXILLARY SINUSITIS, CHRONIC: ICD-10-CM

## 2022-12-15 LAB
GRAM STN SPEC: NORMAL
PATHOLOGY CONSULT NOTE: NORMAL
SIGNIFICANT IND 70042: NORMAL
SITE SITE: NORMAL
SOURCE SOURCE: NORMAL

## 2022-12-15 PROCEDURE — A9270 NON-COVERED ITEM OR SERVICE: HCPCS | Performed by: ANESTHESIOLOGY

## 2022-12-15 PROCEDURE — 160048 HCHG OR STATISTICAL LEVEL 1-5: Performed by: OTOLARYNGOLOGY

## 2022-12-15 PROCEDURE — 160041 HCHG SURGERY MINUTES - EA ADDL 1 MIN LEVEL 4: Performed by: OTOLARYNGOLOGY

## 2022-12-15 PROCEDURE — 160009 HCHG ANES TIME/MIN: Performed by: OTOLARYNGOLOGY

## 2022-12-15 PROCEDURE — 00160 ANES PX NOSE&SINUS NOS: CPT | Performed by: ANESTHESIOLOGY

## 2022-12-15 PROCEDURE — 87075 CULTR BACTERIA EXCEPT BLOOD: CPT

## 2022-12-15 PROCEDURE — 87070 CULTURE OTHR SPECIMN AEROBIC: CPT

## 2022-12-15 PROCEDURE — 700105 HCHG RX REV CODE 258: Performed by: OTOLARYNGOLOGY

## 2022-12-15 PROCEDURE — 88311 DECALCIFY TISSUE: CPT | Mod: 91

## 2022-12-15 PROCEDURE — 160029 HCHG SURGERY MINUTES - 1ST 30 MINS LEVEL 4: Performed by: OTOLARYNGOLOGY

## 2022-12-15 PROCEDURE — 87205 SMEAR GRAM STAIN: CPT

## 2022-12-15 PROCEDURE — 88305 TISSUE EXAM BY PATHOLOGIST: CPT

## 2022-12-15 PROCEDURE — 160025 RECOVERY II MINUTES (STATS): Performed by: OTOLARYNGOLOGY

## 2022-12-15 PROCEDURE — 700111 HCHG RX REV CODE 636 W/ 250 OVERRIDE (IP)

## 2022-12-15 PROCEDURE — 700102 HCHG RX REV CODE 250 W/ 637 OVERRIDE(OP): Performed by: OTOLARYNGOLOGY

## 2022-12-15 PROCEDURE — A9270 NON-COVERED ITEM OR SERVICE: HCPCS | Performed by: OTOLARYNGOLOGY

## 2022-12-15 PROCEDURE — 160002 HCHG RECOVERY MINUTES (STAT): Performed by: OTOLARYNGOLOGY

## 2022-12-15 PROCEDURE — 160046 HCHG PACU - 1ST 60 MINS PHASE II: Performed by: OTOLARYNGOLOGY

## 2022-12-15 PROCEDURE — 87077 CULTURE AEROBIC IDENTIFY: CPT

## 2022-12-15 PROCEDURE — 700101 HCHG RX REV CODE 250: Performed by: ANESTHESIOLOGY

## 2022-12-15 PROCEDURE — 700101 HCHG RX REV CODE 250: Performed by: OTOLARYNGOLOGY

## 2022-12-15 PROCEDURE — 700102 HCHG RX REV CODE 250 W/ 637 OVERRIDE(OP): Performed by: ANESTHESIOLOGY

## 2022-12-15 PROCEDURE — 87076 CULTURE ANAEROBE IDENT EACH: CPT

## 2022-12-15 PROCEDURE — 160035 HCHG PACU - 1ST 60 MINS PHASE I: Performed by: OTOLARYNGOLOGY

## 2022-12-15 PROCEDURE — 700111 HCHG RX REV CODE 636 W/ 250 OVERRIDE (IP): Performed by: ANESTHESIOLOGY

## 2022-12-15 RX ORDER — HYDROMORPHONE HYDROCHLORIDE 1 MG/ML
0.4 INJECTION, SOLUTION INTRAMUSCULAR; INTRAVENOUS; SUBCUTANEOUS
Status: DISCONTINUED | OUTPATIENT
Start: 2022-12-15 | End: 2022-12-15 | Stop reason: HOSPADM

## 2022-12-15 RX ORDER — LABETALOL HYDROCHLORIDE 5 MG/ML
5 INJECTION, SOLUTION INTRAVENOUS
Status: DISCONTINUED | OUTPATIENT
Start: 2022-12-15 | End: 2022-12-15 | Stop reason: HOSPADM

## 2022-12-15 RX ORDER — SODIUM CHLORIDE, SODIUM LACTATE, POTASSIUM CHLORIDE, CALCIUM CHLORIDE 600; 310; 30; 20 MG/100ML; MG/100ML; MG/100ML; MG/100ML
INJECTION, SOLUTION INTRAVENOUS CONTINUOUS
Status: DISCONTINUED | OUTPATIENT
Start: 2022-12-15 | End: 2022-12-15 | Stop reason: HOSPADM

## 2022-12-15 RX ORDER — BACITRACIN ZINC 500 [USP'U]/G
OINTMENT TOPICAL
Status: DISCONTINUED | OUTPATIENT
Start: 2022-12-15 | End: 2022-12-15 | Stop reason: HOSPADM

## 2022-12-15 RX ORDER — LIDOCAINE HYDROCHLORIDE AND EPINEPHRINE 10; 10 MG/ML; UG/ML
INJECTION, SOLUTION INFILTRATION; PERINEURAL
Status: DISCONTINUED | OUTPATIENT
Start: 2022-12-15 | End: 2022-12-15 | Stop reason: HOSPADM

## 2022-12-15 RX ORDER — EPINEPHRINE 1 MG/ML
INJECTION INTRAMUSCULAR; INTRAVENOUS; SUBCUTANEOUS
Status: DISCONTINUED
Start: 2022-12-15 | End: 2022-12-15 | Stop reason: HOSPADM

## 2022-12-15 RX ORDER — CEFAZOLIN SODIUM 1 G/3ML
INJECTION, POWDER, FOR SOLUTION INTRAMUSCULAR; INTRAVENOUS PRN
Status: DISCONTINUED | OUTPATIENT
Start: 2022-12-15 | End: 2022-12-15 | Stop reason: SURG

## 2022-12-15 RX ORDER — MIDAZOLAM HYDROCHLORIDE 1 MG/ML
INJECTION INTRAMUSCULAR; INTRAVENOUS PRN
Status: DISCONTINUED | OUTPATIENT
Start: 2022-12-15 | End: 2022-12-15 | Stop reason: SURG

## 2022-12-15 RX ORDER — DEXAMETHASONE SODIUM PHOSPHATE 4 MG/ML
INJECTION, SOLUTION INTRA-ARTICULAR; INTRALESIONAL; INTRAMUSCULAR; INTRAVENOUS; SOFT TISSUE PRN
Status: DISCONTINUED | OUTPATIENT
Start: 2022-12-15 | End: 2022-12-15 | Stop reason: SURG

## 2022-12-15 RX ORDER — HALOPERIDOL 5 MG/ML
1 INJECTION INTRAMUSCULAR
Status: DISCONTINUED | OUTPATIENT
Start: 2022-12-15 | End: 2022-12-15 | Stop reason: HOSPADM

## 2022-12-15 RX ORDER — AMOXICILLIN AND CLAVULANATE POTASSIUM 875; 125 MG/1; MG/1
1 TABLET, FILM COATED ORAL 2 TIMES DAILY
Qty: 20 TABLET | Refills: 0 | Status: SHIPPED | OUTPATIENT
Start: 2022-12-15 | End: 2022-12-25

## 2022-12-15 RX ORDER — BACITRACIN ZINC 500 [USP'U]/G
OINTMENT TOPICAL
Status: DISCONTINUED
Start: 2022-12-15 | End: 2022-12-15 | Stop reason: HOSPADM

## 2022-12-15 RX ORDER — LIDOCAINE HYDROCHLORIDE 20 MG/ML
INJECTION, SOLUTION EPIDURAL; INFILTRATION; INTRACAUDAL; PERINEURAL PRN
Status: DISCONTINUED | OUTPATIENT
Start: 2022-12-15 | End: 2022-12-15 | Stop reason: SURG

## 2022-12-15 RX ORDER — MEPERIDINE HYDROCHLORIDE 25 MG/ML
12.5 INJECTION INTRAMUSCULAR; INTRAVENOUS; SUBCUTANEOUS
Status: DISCONTINUED | OUTPATIENT
Start: 2022-12-15 | End: 2022-12-15 | Stop reason: HOSPADM

## 2022-12-15 RX ORDER — SCOLOPAMINE TRANSDERMAL SYSTEM 1 MG/1
1 PATCH, EXTENDED RELEASE TRANSDERMAL
Status: DISCONTINUED | OUTPATIENT
Start: 2022-12-15 | End: 2022-12-15 | Stop reason: HOSPADM

## 2022-12-15 RX ORDER — ONDANSETRON 2 MG/ML
INJECTION INTRAMUSCULAR; INTRAVENOUS PRN
Status: DISCONTINUED | OUTPATIENT
Start: 2022-12-15 | End: 2022-12-15 | Stop reason: SURG

## 2022-12-15 RX ORDER — OXYCODONE HCL 5 MG/5 ML
10 SOLUTION, ORAL ORAL
Status: COMPLETED | OUTPATIENT
Start: 2022-12-15 | End: 2022-12-15

## 2022-12-15 RX ORDER — HYDROMORPHONE HYDROCHLORIDE 1 MG/ML
0.2 INJECTION, SOLUTION INTRAMUSCULAR; INTRAVENOUS; SUBCUTANEOUS
Status: DISCONTINUED | OUTPATIENT
Start: 2022-12-15 | End: 2022-12-15 | Stop reason: HOSPADM

## 2022-12-15 RX ORDER — LIDOCAINE HYDROCHLORIDE 10 MG/ML
INJECTION, SOLUTION EPIDURAL; INFILTRATION; INTRACAUDAL; PERINEURAL
Status: DISCONTINUED
Start: 2022-12-15 | End: 2022-12-15 | Stop reason: HOSPADM

## 2022-12-15 RX ORDER — SODIUM CHLORIDE, SODIUM LACTATE, POTASSIUM CHLORIDE, CALCIUM CHLORIDE 600; 310; 30; 20 MG/100ML; MG/100ML; MG/100ML; MG/100ML
INJECTION, SOLUTION INTRAVENOUS CONTINUOUS
Status: ACTIVE | OUTPATIENT
Start: 2022-12-15 | End: 2022-12-15

## 2022-12-15 RX ORDER — HYDRALAZINE HYDROCHLORIDE 20 MG/ML
5 INJECTION INTRAMUSCULAR; INTRAVENOUS
Status: DISCONTINUED | OUTPATIENT
Start: 2022-12-15 | End: 2022-12-15 | Stop reason: HOSPADM

## 2022-12-15 RX ORDER — OXYCODONE HCL 5 MG/5 ML
5 SOLUTION, ORAL ORAL
Status: COMPLETED | OUTPATIENT
Start: 2022-12-15 | End: 2022-12-15

## 2022-12-15 RX ORDER — HYDROMORPHONE HYDROCHLORIDE 1 MG/ML
0.5 INJECTION, SOLUTION INTRAMUSCULAR; INTRAVENOUS; SUBCUTANEOUS
Status: DISCONTINUED | OUTPATIENT
Start: 2022-12-15 | End: 2022-12-15 | Stop reason: HOSPADM

## 2022-12-15 RX ORDER — DEXMEDETOMIDINE HYDROCHLORIDE 100 UG/ML
INJECTION, SOLUTION INTRAVENOUS PRN
Status: DISCONTINUED | OUTPATIENT
Start: 2022-12-15 | End: 2022-12-15 | Stop reason: SURG

## 2022-12-15 RX ORDER — EPINEPHRINE 1 MG/ML(1)
AMPUL (ML) INJECTION
Status: DISCONTINUED | OUTPATIENT
Start: 2022-12-15 | End: 2022-12-15 | Stop reason: HOSPADM

## 2022-12-15 RX ADMIN — DEXMEDETOMIDINE 25 MCG: 200 INJECTION, SOLUTION INTRAVENOUS at 13:11

## 2022-12-15 RX ADMIN — PROPOFOL 100 MG: 10 INJECTION, EMULSION INTRAVENOUS at 13:11

## 2022-12-15 RX ADMIN — SODIUM CHLORIDE, POTASSIUM CHLORIDE, SODIUM LACTATE AND CALCIUM CHLORIDE: 600; 310; 30; 20 INJECTION, SOLUTION INTRAVENOUS at 13:06

## 2022-12-15 RX ADMIN — EPHEDRINE SULFATE 10 MG: 50 INJECTION INTRAMUSCULAR; INTRAVENOUS; SUBCUTANEOUS at 13:19

## 2022-12-15 RX ADMIN — DEXAMETHASONE SODIUM PHOSPHATE 10 MG: 4 INJECTION, SOLUTION INTRA-ARTICULAR; INTRALESIONAL; INTRAMUSCULAR; INTRAVENOUS; SOFT TISSUE at 13:14

## 2022-12-15 RX ADMIN — SCOPOLAMINE 1 PATCH: 1.5 PATCH, EXTENDED RELEASE TRANSDERMAL at 13:01

## 2022-12-15 RX ADMIN — SODIUM CHLORIDE, POTASSIUM CHLORIDE, SODIUM LACTATE AND CALCIUM CHLORIDE: 600; 310; 30; 20 INJECTION, SOLUTION INTRAVENOUS at 14:11

## 2022-12-15 RX ADMIN — EPHEDRINE SULFATE 10 MG: 50 INJECTION INTRAMUSCULAR; INTRAVENOUS; SUBCUTANEOUS at 13:30

## 2022-12-15 RX ADMIN — CEFAZOLIN 2 G: 330 INJECTION, POWDER, FOR SOLUTION INTRAMUSCULAR; INTRAVENOUS at 13:14

## 2022-12-15 RX ADMIN — ONDANSETRON 4 MG: 2 INJECTION INTRAMUSCULAR; INTRAVENOUS at 14:05

## 2022-12-15 RX ADMIN — LIDOCAINE HYDROCHLORIDE 60 MG: 20 INJECTION, SOLUTION EPIDURAL; INFILTRATION; INTRACAUDAL at 13:11

## 2022-12-15 RX ADMIN — OXYCODONE HYDROCHLORIDE 5 MG: 5 SOLUTION ORAL at 15:04

## 2022-12-15 RX ADMIN — FENTANYL CITRATE 50 MCG: 50 INJECTION, SOLUTION INTRAMUSCULAR; INTRAVENOUS at 13:11

## 2022-12-15 RX ADMIN — MIDAZOLAM HYDROCHLORIDE 2 MG: 1 INJECTION, SOLUTION INTRAMUSCULAR; INTRAVENOUS at 13:06

## 2022-12-15 ASSESSMENT — FIBROSIS 4 INDEX: FIB4 SCORE: .982831663806979995

## 2022-12-15 ASSESSMENT — PAIN DESCRIPTION - PAIN TYPE
TYPE: SURGICAL PAIN

## 2022-12-15 NOTE — ANESTHESIA PREPROCEDURE EVALUATION
Case: [id number] Date/Time: 12/15/22 1245    Procedures:       SEPTOPLASTY; STEREOTACTIC COMPUTER-ASSISTED (NAVIGATIONAL) PROCEDURE; CRANIAL, EXTRADURAL; BILATERAL ENDOSCOPIC TOTAL ETHMOIDECTOMY INCLUDING SPHENOIDECTOMY WITH TISSUE REMOVAL,  BILATERAL ENDOSCOPIC MAXILARY ANTROSTOMY WITH TISSUE REMOVAL, BILATERAL ENDOSCOPIC FRONTAL SINUS EXPLORATION      SEPTOPLASTY, NOSE      ENDOSCOPY, PARANASAL SINUS    Pre-op diagnosis: J32.0    Location: [facility] ROOM 22 / SURGERY SAME DAY [facility]    Surgeons: Jules Ann M.D.          Relevant Problems   No relevant active problems     Anes H&P:  PAST MEDICAL HISTORY:   47 y.o. female who presents for Procedure(s):  SEPTOPLASTY; STEREOTACTIC COMPUTER-ASSISTED (NAVIGATIONAL) PROCEDURE; CRANIAL, EXTRADURAL; BILATERAL ENDOSCOPIC TOTAL ETHMOIDECTOMY INCLUDING SPHENOIDECTOMY WITH TISSUE REMOVAL,  BILATERAL ENDOSCOPIC MAXILARY ANTROSTOMY WITH TISSUE REMOVAL, BILATERAL ENDOSCOPIC FRONTAL SINUS EXPLORATION  SEPTOPLASTY, NOSE  ENDOSCOPY, PARANASAL SINUS.  She has current and past medical problems significant for:    Past Medical History:   Diagnosis Date   • Abdominal aortic atherosclerosis (HCC) 10/2021   • Asthma    • Borderline personality disorder (HCC) 05/2017   • Depression    • Hypertension     per patient, not on any medications       SMOKING/ALCOHOL/RECREATIONAL DRUG USE:  Social History     Tobacco Use   • Smoking status: Never   • Smokeless tobacco: Never   Vaping Use   • Vaping Use: Never used   Substance Use Topics   • Alcohol use: Yes     Comment: occ   • Drug use: Yes     Types: Oral, Marijuana     Comment: edible sativa     Social History     Substance and Sexual Activity   Drug Use Yes   • Types: Oral, Marijuana    Comment: edible sativa       PAST SURGICAL HISTORY:  Past Surgical History:   Procedure Laterality Date   • OTHER  09/2020    bilateral oophorectomy/salpingectomy   • ABDOMINAL HYSTERECTOMY TOTAL     • TUMOR EXCISION WITH BIOPSY      stomach - benign        ALLERGIES:   Allergies   Allergen Reactions   • Atorvastatin    • Benadryl [Diphenhydramine]        MEDICATIONS:  No current facility-administered medications on file prior to encounter.     Current Outpatient Medications on File Prior to Encounter   Medication Sig Dispense Refill   • montelukast (SINGULAIR) 10 MG Tab TAKE 1 TABLET BY MOUTH EVERY DAY 90 Tab 2   • albuterol 108 (90 Base) MCG/ACT Aero Soln inhalation aerosol INHALE 2 PUFFS BY MOUTH EVERY 6 HOURS AS NEEDED FOR SHORTNESS OF BREATH 8.5 g 3       LABS:  Lab Results   Component Value Date/Time    HEMOGLOBIN 14.8 07/27/2021 1726    HEMATOCRIT 44.3 07/27/2021 1726    WBC 8.5 07/27/2021 1726     Lab Results   Component Value Date/Time    SODIUM 141 07/27/2021 1726    POTASSIUM 4.2 07/27/2021 1726    CHLORIDE 103 07/27/2021 1726    CO2 26 07/27/2021 1726    GLUCOSE 83 07/27/2021 1726    BUN 11 07/27/2021 1726    CALCIUM 9.3 07/27/2021 1726         PREVIOUS ANESTHETICS: See EMR  __________________________________________      Physical Exam    Airway   Mallampati: I  TM distance: >3 FB  Neck ROM: full       Cardiovascular - normal exam  Rhythm: regular  Rate: normal  (-) murmur     Dental - normal exam           Pulmonary - normal exam  Breath sounds clear to auscultation     Abdominal    Neurological - normal exam                 Anesthesia Plan    ASA 2       Plan - general       Airway plan will be ETT          Induction: intravenous    Postoperative Plan: Postoperative administration of opioids is intended.    Pertinent diagnostic labs and testing reviewed    Informed Consent:    Anesthetic plan and risks discussed with patient.    Use of blood products discussed with: patient whom consented to blood products.

## 2022-12-15 NOTE — OR NURSING
1530 - Pt to phase II bay 20 from PACU.  Report from CRISTIANA Borges.  Pt attached to monitoring, VSS, on room air. Minimal pain, denies nausea and tolerating fluids.    1544 - Pt stable to discharge.  Instructions given, patient and family verbalize understanding.  IV And armbands removed.  Taken via wheelchair to car.  Pt has all belongings with them. Nasal sling and gauze given for home.

## 2022-12-15 NOTE — ANESTHESIA PROCEDURE NOTES
Airway    Date/Time: 12/15/2022 1:11 PM  Performed by: Stanley Redd M.D.  Authorized by: Stanley Redd M.D.     Location:  OR  Urgency:  Elective  Difficult Airway: No    Indications for Airway Management:  Anesthesia      Spontaneous Ventilation: absent    Sedation Level:  Deep  Preoxygenated: Yes    Mask Difficulty Assessment:  1 - vent by mask  Final Airway Type:  Supraglottic airway  Final Supraglottic Airway:  Flexible LMA    SGA Size:  3  Number of Attempts at Approach:  1

## 2022-12-15 NOTE — OR NURSING
1431 Pt to PACU from OR. Report from anesthesia and OR RN. On 6L O2 via mask. Respirations even and unlabored. VSS.     1506 Patient's  updated via phone call.    1517 Patient's nose packing pulled.    1528 Report given to Trina ASKEW RN. All questions and concerns addressed at this time.    1530 Patient out of phase 1 and transferred to phase 2 via [place].

## 2022-12-15 NOTE — OR SURGEON
Immediate Post OP Note    PreOp Diagnosis: crs, septal deviation, rars      PostOp Diagnosis: same      Procedure(s):  B max   B total ethmoid   Septoplasty   With IGS     Surgeon(s):  Jules Ann M.D.    Anesthesiologist/Type of Anesthesia:  Anesthesiologist: Stanley Redd M.D./General    Surgical Staff:  Circulator: Jes Lyon R.N.  Scrub Person: Ana María Garza    Specimens removed if any:  ID Type Source Tests Collected by Time Destination   1 : left maxillary Other Other AEROBIC/ANAEROBIC CULTURE (SURGERY) Jules Ann M.D. 12/15/2022  1:51 PM    A : right sinus contents Other Other PATHOLOGY SPECIMEN Jules Ann M.D. 12/15/2022 11:52 AM    B : left sinus contents Other Other PATHOLOGY SPECIMEN Jules Ann M.D. 12/15/2022 11:52 AM        Estimated Blood Loss: 25cc    Findings: septum to L, thick mucus L max - cx     Complications: none     Splints, augmentin      12/15/2022 2:28 PM Jules Ann M.D.

## 2022-12-15 NOTE — DISCHARGE INSTRUCTIONS
Neilmed rinses 3x/day start tomorrow   Follow up 1 week    If any questions arise, call your provider.  If your provider is not available, please feel free to call the [facility] at [phone].    MEDICATIONS: Resume taking daily medication.  Take prescribed pain medication with food.  If no medication is prescribed, you may take non-aspirin pain medication if needed.  PAIN MEDICATION CAN BE VERY CONSTIPATING.  Take a stool softener or laxative such as senokot, pericolace, or milk of magnesia if needed.    Last pain medication given at 3 pm. You may have tylenol at anytime.     What to Expect Post Anesthesia    Rest and take it easy for the first 24 hours.  A responsible adult is recommended to remain with you during that time.  It is normal to feel sleepy.  We encourage you to not do anything that requires balance, judgment or coordination.    FOR 24 HOURS DO NOT:  Drive, operate machinery or run household appliances.  Drink beer or alcoholic beverages.  Make important decisions or sign legal documents.    To avoid nausea, slowly advance diet as tolerated, avoiding spicy or greasy foods for the first day.  Add more substantial food to your diet according to your provider's instructions.  Babies can be fed formula or breast milk as soon as they are hungry.  INCREASE FLUIDS AND FIBER TO AVOID CONSTIPATION.    MILD FLU-LIKE SYMPTOMS ARE NORMAL.  YOU MAY EXPERIENCE GENERALIZED MUSCLE ACHES, THROAT IRRITATION, HEADACHE AND/OR SOME NAUSEA.

## 2022-12-16 NOTE — ANESTHESIA POSTPROCEDURE EVALUATION
Patient: Cindy Yung    Procedure Summary     Date: 12/15/22 Room / Location: [facility] ROOM 22 / SURGERY SAME DAY [facility]    Anesthesia Start: 1306 Anesthesia Stop: 1440    Procedures:       STEREOTACTIC COMPUTER-ASSISTED (NAVIGATIONAL) PROCEDURE; CRANIAL, EXTRADURAL; BILATERAL ENDOSCOPIC TOTAL ETHMOIDECTOMY WITH TISSUE REMOVAL,  BILATERAL ENDOSCOPIC MAXILARY ANTROSTOMY WITH TISSUE REMOVAL (Bilateral: Nose)      SEPTOPLASTY, NOSE (Nose)      ENDOSCOPY, PARANASAL SINUS (Bilateral: Nose) Diagnosis: (Chronic maxillary sinusitis)    Surgeons: Jules Ann M.D. Responsible Provider: Stanley Redd M.D.    Anesthesia Type: general ASA Status: 2          Final Anesthesia Type: general  Last vitals  BP   Blood Pressure: 126/71    Temp   36.2 °C (97.2 °F)    Pulse   81   Resp   16    SpO2   93 %      Anesthesia Post Evaluation    Patient location during evaluation: PACU  Patient participation: complete - patient participated  Level of consciousness: sleepy but conscious    Airway patency: patent  Anesthetic complications: no  Cardiovascular status: hemodynamically stable  Respiratory status: acceptable  Hydration status: balanced    PONV: none          No notable events documented.     Nurse Pain Score: 2 (NPRS)

## 2022-12-16 NOTE — OP REPORT
DATE OF SERVICE:  12/15/2022     PREOPERATIVE DIAGNOSES:  1.  Chronic sinusitis.  2.  Septal deviation.  3.  Recurrent acute sinusitis.     POSTOPERATIVE DIAGNOSES:  1.  Chronic sinusitis.  2.  Septal deviation.  3.  Recurrent acute sinusitis.     PROCEDURES:  1.  Bilateral maxillary antrostomy.  2.  Bilateral total ethmoidectomy.  3.  Septoplasty.  4.  With image guidance.     SURGEON:  Jules Ann MD     ASSISTANT:  None.     ANESTHESIA:  General endotracheal (Stanley Redd MD).     ESTIMATED BLOOD LOSS:  25 mL     COMPLICATIONS:  None.     SPECIMENS:  Right and left sinus contents and left maxillary culture.     INDICATIONS:  A 47-year-old who has had more than five episodes of recurrent   acute sinusitis this year.  She has failed medical management including   budesonide rinses.  After discussion of the risks, benefits and alternatives,   she elected to undergo the above procedure.     FINDINGS:  The septum was deviated to the left.  There was a thick glob of   mucus on the left maxillary that was cultured.  There were bilateral large   Shailesh's cells.     DESCRIPTION OF PROCEDURE:  The patient was brought to the operating room,   intubated by anesthesia, turned 90 degrees, draped in a sterile fashion.    Timeout was performed.  Preoperative antibiotics were given.  A 1:1000   epinephrine-soaked pledgets stained with fluorescein placed in the nasal   cavity.  Once this was allowed to take effect, I injected the right and left   maxillary lines, sphenopalatine region and septum with 1% lidocaine, 1:100,000   epinephrine.  I registered the image guidance, was found to be good fidelity   in all 3 planes.  I began sinus surgery on the right side.  I resected the   uncinate with pediatric backbiter, microdebrider and Kerrisons.  I used a   30-degree endoscope, identified the natural ostium, widened it posteriorly   using curved scissor, side biter, the microdebrider.  There was a very large   Shailesh's  cell, which I removed the inferior aspect and medial aspect using   Kerrisons and a Hosemann.  I switched back to the 0.  I entered the bulla and   resected with spoon and microdebrider and went through the basal lamella at   the intersection of horizontal and vertical portions.  I entered the posterior   ethmoid cells, cleared partitions in a posterior to anterior manner, after   identified the orbit and skull base.  I did not explore the frontal or   sphenoid since they were normal on scan.     The septum was deviated to the left into the middle meatus and made an   incision a centimeter back from the caudal septum elevating the   subperichondrial, subperiosteal plane on the left side, went through the   septum, septal cartilage just posterior to my incision, elevating the   subperichondrial subperiosteal plane on the right side.  I resected the   deviated bone and cartilage.  There was a tear on the left flap.  The right   flap was intact.  I then closed septum with quilting 4-0 plain gut suture.  In   the course of placing the suture, there was a small tear on the right flap   that did overlap with the left tear, it is very small, so I elected to place   Trejo splints at the end of the case and secured them transseptally at the end   with a 2-0 Prolene.     I turned my attention to the left sinuses.  I resected the uncinate as I had   on the other side with a 30-degree endoscope, I identified the natural ostium   of the maxillary, widened it posteriorly using curved scissor, side biter and   microdebrider.  Once again, I removed the inferior medial aspect of the   Shailesh's cell using Kerrisons and the Hosemann.  I then switched back to the   0.  I entered the bulla and resected with spoon and microdebrider and went   through the basal lamella at the intersection of horizontal and vertical   portions.  I entered the posterior ethmoid cells and then cleared partitions   in a posterior to anterior manner.  Once  again, I did not explore the frontals   of the sphenoid since it was normal on the scan.  Hemostasis was good.  I   closed the septum as described previously, I placed bacitracin-coated finger   cot in each middle meatus and secured them to each other.  The patient was   then awakened from anesthesia and taken to recovery in stable condition.    Counts were correct. I discharged her home on Augmentin.        ______________________________  MD CICI [facility]    DD:  12/15/2022 14:46  DT:  12/15/2022 17:53    Job#:  [id number]

## 2022-12-17 LAB
BACTERIA WND AEROBE CULT: ABNORMAL
BACTERIA WND AEROBE CULT: ABNORMAL
GRAM STN SPEC: ABNORMAL
SIGNIFICANT IND 70042: ABNORMAL
SITE SITE: ABNORMAL
SOURCE SOURCE: ABNORMAL

## 2022-12-19 LAB
BACTERIA SPEC ANAEROBE CULT: ABNORMAL
BACTERIA SPEC ANAEROBE CULT: ABNORMAL
SIGNIFICANT IND 70042: ABNORMAL
SITE SITE: ABNORMAL
SOURCE SOURCE: ABNORMAL

## 2025-07-07 ENCOUNTER — APPOINTMENT (OUTPATIENT)
Dept: RADIOLOGY | Facility: MEDICAL CENTER | Age: 50
End: 2025-07-07
Attending: STUDENT IN AN ORGANIZED HEALTH CARE EDUCATION/TRAINING PROGRAM
Payer: COMMERCIAL

## 2025-07-07 ENCOUNTER — HOSPITAL ENCOUNTER (EMERGENCY)
Facility: MEDICAL CENTER | Age: 50
End: 2025-07-07
Attending: STUDENT IN AN ORGANIZED HEALTH CARE EDUCATION/TRAINING PROGRAM
Payer: COMMERCIAL

## 2025-07-07 ENCOUNTER — OFFICE VISIT (OUTPATIENT)
Dept: URGENT CARE | Facility: CLINIC | Age: 50
End: 2025-07-07
Payer: COMMERCIAL

## 2025-07-07 VITALS
HEART RATE: 70 BPM | BODY MASS INDEX: 26.93 KG/M2 | HEIGHT: 63 IN | RESPIRATION RATE: 16 BRPM | SYSTOLIC BLOOD PRESSURE: 166 MMHG | DIASTOLIC BLOOD PRESSURE: 90 MMHG | OXYGEN SATURATION: 99 % | WEIGHT: 152 LBS | TEMPERATURE: 97.6 F

## 2025-07-07 VITALS
TEMPERATURE: 98.7 F | BODY MASS INDEX: 26.91 KG/M2 | HEIGHT: 63 IN | OXYGEN SATURATION: 96 % | DIASTOLIC BLOOD PRESSURE: 81 MMHG | SYSTOLIC BLOOD PRESSURE: 124 MMHG | RESPIRATION RATE: 16 BRPM | HEART RATE: 57 BPM | WEIGHT: 151.9 LBS

## 2025-07-07 DIAGNOSIS — R07.9 CHEST PAIN, UNSPECIFIED TYPE: Primary | ICD-10-CM

## 2025-07-07 DIAGNOSIS — R68.89 FEELING UNWELL: ICD-10-CM

## 2025-07-07 DIAGNOSIS — J34.89 SINUS PRESSURE: ICD-10-CM

## 2025-07-07 DIAGNOSIS — R10.13 DYSPEPSIA: ICD-10-CM

## 2025-07-07 DIAGNOSIS — M54.9 PAIN, UPPER BACK: ICD-10-CM

## 2025-07-07 DIAGNOSIS — R07.89 SENSATION OF CHEST PRESSURE: Primary | ICD-10-CM

## 2025-07-07 DIAGNOSIS — I10 ELEVATED BLOOD PRESSURE READING WITH DIAGNOSIS OF HYPERTENSION: ICD-10-CM

## 2025-07-07 LAB
ALBUMIN SERPL BCP-MCNC: 4.5 G/DL (ref 3.2–4.9)
ALBUMIN/GLOB SERPL: 1.7 G/DL
ALP SERPL-CCNC: 53 U/L (ref 30–99)
ALT SERPL-CCNC: 19 U/L (ref 2–50)
ANION GAP SERPL CALC-SCNC: 11 MMOL/L (ref 7–16)
AST SERPL-CCNC: 24 U/L (ref 12–45)
BASOPHILS # BLD AUTO: 1.3 % (ref 0–1.8)
BASOPHILS # BLD: 0.09 K/UL (ref 0–0.12)
BILIRUB SERPL-MCNC: 0.6 MG/DL (ref 0.1–1.5)
BUN SERPL-MCNC: 10 MG/DL (ref 8–22)
CALCIUM ALBUM COR SERPL-MCNC: 9.1 MG/DL (ref 8.5–10.5)
CALCIUM SERPL-MCNC: 9.5 MG/DL (ref 8.5–10.5)
CHLORIDE SERPL-SCNC: 105 MMOL/L (ref 96–112)
CO2 SERPL-SCNC: 23 MMOL/L (ref 20–33)
CREAT SERPL-MCNC: 0.71 MG/DL (ref 0.5–1.4)
EKG IMPRESSION: NORMAL
EOSINOPHIL # BLD AUTO: 0.12 K/UL (ref 0–0.51)
EOSINOPHIL NFR BLD: 1.7 % (ref 0–6.9)
ERYTHROCYTE [DISTWIDTH] IN BLOOD BY AUTOMATED COUNT: 42.7 FL (ref 35.9–50)
GFR SERPLBLD CREATININE-BSD FMLA CKD-EPI: 103 ML/MIN/1.73 M 2
GLOBULIN SER CALC-MCNC: 2.7 G/DL (ref 1.9–3.5)
GLUCOSE SERPL-MCNC: 87 MG/DL (ref 65–99)
HCG SERPL QL: NEGATIVE
HCT VFR BLD AUTO: 40.5 % (ref 37–47)
HGB BLD-MCNC: 13.5 G/DL (ref 12–16)
IMM GRANULOCYTES # BLD AUTO: 0.02 K/UL (ref 0–0.11)
IMM GRANULOCYTES NFR BLD AUTO: 0.3 % (ref 0–0.9)
LYMPHOCYTES # BLD AUTO: 1.78 K/UL (ref 1–4.8)
LYMPHOCYTES NFR BLD: 25.9 % (ref 22–41)
MCH RBC QN AUTO: 30.9 PG (ref 27–33)
MCHC RBC AUTO-ENTMCNC: 33.3 G/DL (ref 32.2–35.5)
MCV RBC AUTO: 92.7 FL (ref 81.4–97.8)
MONOCYTES # BLD AUTO: 0.65 K/UL (ref 0–0.85)
MONOCYTES NFR BLD AUTO: 9.4 % (ref 0–13.4)
NEUTROPHILS # BLD AUTO: 4.22 K/UL (ref 1.82–7.42)
NEUTROPHILS NFR BLD: 61.4 % (ref 44–72)
NRBC # BLD AUTO: 0 K/UL
NRBC BLD-RTO: 0 /100 WBC (ref 0–0.2)
NT-PROBNP SERPL IA-MCNC: 235 PG/ML (ref 0–125)
PLATELET # BLD AUTO: 240 K/UL (ref 164–446)
PMV BLD AUTO: 10.3 FL (ref 9–12.9)
POTASSIUM SERPL-SCNC: 3.8 MMOL/L (ref 3.6–5.5)
PROT SERPL-MCNC: 7.2 G/DL (ref 6–8.2)
RBC # BLD AUTO: 4.37 M/UL (ref 4.2–5.4)
SODIUM SERPL-SCNC: 139 MMOL/L (ref 135–145)
TROPONIN T SERPL-MCNC: <6 NG/L (ref 6–19)
WBC # BLD AUTO: 6.9 K/UL (ref 4.8–10.8)

## 2025-07-07 PROCEDURE — 80053 COMPREHEN METABOLIC PANEL: CPT

## 2025-07-07 PROCEDURE — 84703 CHORIONIC GONADOTROPIN ASSAY: CPT

## 2025-07-07 PROCEDURE — 93005 ELECTROCARDIOGRAM TRACING: CPT | Mod: TC

## 2025-07-07 PROCEDURE — 71045 X-RAY EXAM CHEST 1 VIEW: CPT

## 2025-07-07 PROCEDURE — 83880 ASSAY OF NATRIURETIC PEPTIDE: CPT

## 2025-07-07 PROCEDURE — 93005 ELECTROCARDIOGRAM TRACING: CPT | Mod: TC | Performed by: STUDENT IN AN ORGANIZED HEALTH CARE EDUCATION/TRAINING PROGRAM

## 2025-07-07 PROCEDURE — 93000 ELECTROCARDIOGRAM COMPLETE: CPT | Performed by: PHYSICIAN ASSISTANT

## 2025-07-07 PROCEDURE — 99204 OFFICE O/P NEW MOD 45 MIN: CPT | Performed by: PHYSICIAN ASSISTANT

## 2025-07-07 PROCEDURE — 99284 EMERGENCY DEPT VISIT MOD MDM: CPT

## 2025-07-07 PROCEDURE — 84484 ASSAY OF TROPONIN QUANT: CPT

## 2025-07-07 PROCEDURE — 85025 COMPLETE CBC W/AUTO DIFF WBC: CPT

## 2025-07-07 PROCEDURE — 36415 COLL VENOUS BLD VENIPUNCTURE: CPT

## 2025-07-07 ASSESSMENT — ENCOUNTER SYMPTOMS
SPUTUM PRODUCTION: 0
BACK PAIN: 1
SINUS PAIN: 1
HEARTBURN: 1
HEADACHES: 0
NERVOUS/ANXIOUS: 1
COUGH: 0
SHORTNESS OF BREATH: 0
MYALGIAS: 1
HEMOPTYSIS: 0
TINGLING: 0
CHILLS: 0
FEVER: 0

## 2025-07-07 ASSESSMENT — HEART SCORE
TROPONIN: LESS THAN OR EQUAL TO NORMAL LIMIT
RISK FACTORS: 1-2 RISK FACTORS
AGE: 45-64
HEART SCORE: 2
HISTORY: SLIGHTLY SUSPICIOUS
ECG: NORMAL

## 2025-07-07 ASSESSMENT — PAIN DESCRIPTION - PAIN TYPE: TYPE: ACUTE PAIN

## 2025-07-07 NOTE — ED TRIAGE NOTES
"Chief Complaint   Patient presents with    Chest Pain     Pt reports central CP and SOB and states she generally feels unwell.   Pt denies N/V.        51 yo F to triage for above complaint. Pt was seen at  this AM and sent to ED for further eval. CP protocol ordered.      Pt placed in lobby. Pt educated on triage process. Pt encouraged to alert staff for any changes.     Patient and staff wearing appropriate PPE    BP (!) 185/94   Pulse 63   Temp 36.7 °C (98.1 °F)   Resp 18   Ht 1.6 m (5' 3\")   Wt 68.9 kg (151 lb 14.4 oz)   SpO2 98%   BMI 26.91 kg/m²     "

## 2025-07-07 NOTE — PROGRESS NOTES
Subjective     Arcelia Yung is a 50 y.o. female who presents with Blood Pressure Problem (High bp, xtoday, feeling sick, hot flashes, clammy, loss of appetite) and Sinus Pain (Sinus congestion, nasal drip, teeth pain, chest pain, shoulder pain after physical activity)    PMH:  has a past medical history of Abdominal aortic atherosclerosis (HCC) (10/2021), Asthma, Borderline personality disorder (HCC) (05/2017), Depression, and Hypertension.  MEDS: Current Medications[1]  ALLERGIES: Allergies[2]  SURGHX: Past Surgical History[3]  SOCHX:  reports that she has never smoked. She has never used smokeless tobacco. She reports current alcohol use. She reports current drug use. Drugs: Oral and Marijuana.  FH: Reviewed with patient, not pertinent to this visit.           Patient with history of hypertension, followed by Dr. Ge but is not currently on medications due to significant side effects.  Patient sees Dr. Ge every 2 months.  Presents to clinic today with complaint of sensation of chest pressure, dyspepsia, feeling clammy and loss of appetite since about midnight last night.  Patient went to work today at [employer] and had significantly elevated blood pressure at work so she came in for evaluation.  PT also complaining of sinus pain and pressure, postnasal drip for the last several days but she has not been taking any medications for the symptoms.  Patient does use albuterol inhaler for her asthma, she did use that today with some relief of her shortness of breath but not the other symptoms.  PT also complains of some sore muscles in her upper back but did go [place] [place] 2 days ago and was sore the next day, thinks this is residual soreness from that.  Patient has not taken any other medications today for her symptoms.  No other complaints.    Sinus Pain  Associated symptoms include chest pain and myalgias. Pertinent negatives include no chills, coughing, fever or headaches.       Review of Systems  "  Constitutional:  Negative for chills and fever.   HENT:  Positive for sinus pain.    Respiratory:  Negative for cough, hemoptysis, sputum production and shortness of breath.    Cardiovascular:  Positive for chest pain. Negative for leg swelling.   Gastrointestinal:  Positive for heartburn.   Musculoskeletal:  Positive for back pain and myalgias.   Neurological:  Negative for tingling and headaches.   Psychiatric/Behavioral:  The patient is nervous/anxious.    All other systems reviewed and are negative.             Objective     BP (!) 166/90 (BP Location: Left arm, Patient Position: Sitting, BP Cuff Size: Adult)   Pulse 70   Temp 36.4 °C (97.6 °F) (Temporal)   Resp 16   Ht 1.6 m (5' 3\")   Wt 68.9 kg (152 lb)   SpO2 99%   BMI 26.93 kg/m²      Physical Exam  Vitals and nursing note reviewed.   Constitutional:       General: She is not in acute distress.     Appearance: Normal appearance. She is well-developed. She is not ill-appearing or toxic-appearing.   HENT:      Head: Normocephalic and atraumatic.      Right Ear: Tympanic membrane normal.      Left Ear: Tympanic membrane normal.      Nose: Nose normal.      Mouth/Throat:      Lips: Pink.      Mouth: Mucous membranes are moist.      Pharynx: Oropharynx is clear. Uvula midline.   Eyes:      Extraocular Movements: Extraocular movements intact.      Conjunctiva/sclera: Conjunctivae normal.      Pupils: Pupils are equal, round, and reactive to light.   Cardiovascular:      Rate and Rhythm: Normal rate and regular rhythm.      Pulses: Normal pulses.      Heart sounds: Normal heart sounds.   Pulmonary:      Effort: Pulmonary effort is normal.      Breath sounds: Normal breath sounds.   Abdominal:      General: Bowel sounds are normal.      Palpations: Abdomen is soft.   Musculoskeletal:         General: Normal range of motion.      Cervical back: Normal range of motion and neck supple.   Skin:     General: Skin is warm and dry.      Capillary Refill: Capillary " refill takes less than 2 seconds.   Neurological:      General: No focal deficit present.      Mental Status: She is alert and oriented to person, place, and time.      Cranial Nerves: No cranial nerve deficit.      Motor: Motor function is intact.      Coordination: Coordination is intact.      Gait: Gait normal.   Psychiatric:         Mood and Affect: Mood normal.         Behavior: Behavior normal.                                  Assessment & Plan  Sensation of chest pressure  The ASCVD Risk score (Vikash NOVOA, et al., 2019) failed to calculate for the following reasons:    Cannot find a previous HDL lab    Cannot find a previous total cholesterol lab      Orders:    EKG - Clinic Performed    Elevated blood pressure reading with diagnosis of hypertension    Orders:    EKG - Clinic Performed         EKG scanned into chart under media tab.     EKG Interpretation   Interpreted by me   Rhythm: normal sinus   Rate: 59 sinus darrel  Axis: normal   Ectopy: none   Conduction: normal   ST Segments: no acute change   T Waves: no acute change   Q Waves: none   Clinical Impression: no acute changes and normal EKG    No changes from previous EKG 7/21/21.      Patient with history of hypertension presents to clinic today complaining of anterior chest pressure, dyspepsia since about midnight last night and upper back soreness (this could be from go-cart racing 2 days ago).  Patient used her albuterol inhaler to see if that would help with the sensation of chest pressure which did slightly but it has remained unchanged after that.    Due to patient's complaint  an elevated blood pressure reading in clinic today I feel that the pt requires evaluation and treatment at a facility that can provide a higher level of care due to acuity of illness/complaint.  I discussed with patient she may have some studies ordered to help evaluate cause of symptoms which could include stat labs, troponin, D-dimer, cardiac monitoring, advanced imaging,  cardiac monitoring or other testing.    EMS transport was offered, patient politely declined.  Patient Jayesh will drive her POV to [facility] emergency department.    I have contacted [facility] , spoken with RN Indira with pt information and pending arrival to Emergency Room.        Differential diagnosis, supportive care, and indications for immediate follow-up discussed with patient.  Instructed to return to clinic or nearest emergency department for any change in condition, further concerns, or worsening of symptoms.    I personally reviewed prior external notes and test results pertinent to today's visit.  I have independently reviewed and interpreted all diagnostics ordered during this urgent care visit.      Discussed red flags and reasons to return to  or ED.      Pt and/or family verbalized understanding of diagnosis and follow up instructions and was offered informational handout on diagnosis.  PT discharged.     Please note that this dictation was created using voice recognition software. I have made every reasonable attempt to correct obvious errors, but I expect that there may be errors of grammar and possibly content that I did not discover before finalizing the note.          [1]   Current Outpatient Medications:     NON SPECIFIED, Blood pressure support supplement, Disp: , Rfl:     Cannabinoids (THC FREE) 20 MG/ML Liquid, Take  by mouth., Disp: , Rfl:     Multiple Vitamin (MULTI-VITAMIN DAILY PO), Take 1 Tablet by mouth every evening. Once day, Disp: , Rfl:     Non Formulary Request, Take 2 Tablets by mouth every evening., Disp: , Rfl:     albuterol 108 (90 Base) MCG/ACT Aero Soln inhalation aerosol, INHALE 2 PUFFS BY MOUTH EVERY 6 HOURS AS NEEDED FOR SHORTNESS OF BREATH, Disp: 8.5 g, Rfl: 3  [2]   Allergies  Allergen Reactions    Atorvastatin     Benadryl [Diphenhydramine]    [3]   Past Surgical History:  Procedure Laterality Date    [place] REPAIR OF NASAL SEPTUM N/A 12/15/2022    Procedure: SEPTOPLASTY,  NOSE;  Surgeon: uJles Ann M.D.;  Location: SURGERY SAME DAY [facility];  Service: Ent    STEREOTACTIC COMPUTER ASSISTED PROCEDURE CRANIAL, EXTRADURAL Bilateral 12/15/2022    Procedure: STEREOTACTIC COMPUTER-ASSISTED (NAVIGATIONAL) PROCEDURE; CRANIAL, EXTRADURAL; BILATERAL ENDOSCOPIC TOTAL ETHMOIDECTOMY WITH TISSUE REMOVAL,  BILATERAL ENDOSCOPIC MAXILARY ANTROSTOMY WITH TISSUE REMOVAL;  Surgeon: Jules Ann M.D.;  Location: SURGERY SAME DAY [facility];  Service: Ent    ENDOSCOPY, PARANASAL SINUS, WITH TOTAL ETHMOIDECTOMY, SN Bilateral 12/15/2022    Procedure: ENDOSCOPY, PARANASAL SINUS;  Surgeon: Jules Ann M.D.;  Location: SURGERY SAME DAY [facility];  Service: Ent    OTHER  09/2020    bilateral oophorectomy/salpingectomy    ABDOMINAL HYSTERECTOMY TOTAL      TUMOR EXCISION WITH BIOPSY      stomach - benign

## 2025-07-08 NOTE — ED PROVIDER NOTES
CHIEF COMPLAINT  Chief Complaint   Patient presents with    Chest Pain     Pt reports central CP and SOB and states she generally feels unwell.   Pt denies N/V.        LIMITATION TO HISTORY   Select: None    HPI    Cindy Yung is a 50 y.o. female who presents to the Emergency Department presents for evaluation of generally not feeling well.  She stated that last night she began to not feel well and felt worse today.  She describes it as a general uneasy feeling.  Does state that she has had an abnormal sensation in her chest described as a feeling she needs to burp which started last night and persisted throughout the day.  Also has had some tooth pain.  Was also complaining that she was having some sinus pressure thought she may have a sinus infection though took a COVID test and this was negative.  She checked her blood pressure at work and found it elevated in the 160 systolic range.  Does have a history of hypertension hyperlipidemia, went to an urgent care and was referred here.  OUTSIDE HISTORIAN(S):  Select:     EXTERNAL RECORDS REVIEWED  Select: Other reviewed urgent care note, was sent here for evaluation of chest pain      PAST MEDICAL HISTORY  Past Medical History[1]  .    SURGICAL HISTORY  Past Surgical History[2]      FAMILY HISTORY  Family History   Problem Relation Age of Onset    Diabetes Father         border-line    Hypertension Father     Thyroid Sister     Psychiatric Illness Sister         anxiety          SOCIAL HISTORY  Social History     Socioeconomic History    Marital status:      Spouse name: Not on file    Number of children: Not on file    Years of education: Not on file    Highest education level: Not on file   Occupational History    Not on file   Tobacco Use    Smoking status: Never    Smokeless tobacco: Never   Vaping Use    Vaping status: Never Used   Substance and Sexual Activity    Alcohol use: Yes     Comment: occ    Drug use: Yes     Types: Oral,  "Marijuana     Comment: edible sativa    Sexual activity: Yes     Partners: Male     Comment: ; three kids; 24,22, 21   Other Topics Concern    Not on file   Social History Narrative    Not on file     Social Drivers of Health     Financial Resource Strain: Not on file   Food Insecurity: Not on file   Transportation Needs: Not on file   Physical Activity: Not on file   Stress: Not on file   Social Connections: Not on file   Intimate Partner Violence: Not on file   Housing Stability: Not on file         CURRENT MEDICATIONS  Medications Ordered Prior to Encounter[3]        ALLERGIES  Allergies[4]    PHYSICAL EXAM  VITAL SIGNS:BP (!) 117/93   Pulse (!) 56   Temp 37.1 °C (98.7 °F) (Temporal)   Resp 16   Ht 1.6 m (5' 3\")   Wt 68.9 kg (151 lb 14.4 oz)   SpO2 96%   BMI 26.91 kg/m²       VITALS - vital signs documented prior to this note have been reviewed and noted,  GENERAL - awake, alert, oriented, GCS 15, no apparent distress, non-toxic  appearing  HEENT - normocephalic, atraumatic, pupils equal, sclera anicteric, mucus  membranes moist  NECK - supple, no meningismus, full active range of motion, trachea midline  CARDIOVASCULAR - regular rate/rhythm, no murmurs/gallops/rubs  PULMONARY - no respiratory distress, speaking in full sentences, clear to  auscultation bilaterally, no wheezing/ronchi/rales, no accessory muscle use  GASTROINTESTINAL - soft, non-tender, non-distended, no rebound, guarding,  or peritonitis  GENITOURINARY - Deferred  NEUROLOGIC - Awake alert, normal mental status, speech fluid, cognition  normal, moves all extremities  MUSCULOSKELETAL - no obvious asymmetry or deformities present  EXTREMITIES - warm, well-perfused, no cyanosis or significant edema  DERMATOLOGIC - warm, dry, no rashes, no jaundice  PSYCHIATRIC - normal affect, normal insight, normal concentration    DIAGNOSTIC STUDIES / PROCEDURES  EKG  I have independently interpreted this EKG  Interpreted below by myself as EKG shows " a normal sinus bradycardia with a normal ID QRS QTc interval there is a normal axis.  There is no ST elevation or depression to suggest ischemia no abnormal T wave inversion.  There is no STEMI pattern.  Interpretation is normal sinus rhythm as interpreted by myself    LABS  Labs Reviewed   PROBRAIN NATRIURETIC PEPTIDE, NT - Abnormal; Notable for the following components:       Result Value    NT-proBNP 235 (*)     All other components within normal limits   CBC WITH DIFFERENTIAL   COMP METABOLIC PANEL   TROPONIN   HCG QUAL SERUM   ESTIMATED GFR       Troponin negative EKG nonischemic doubt Mace  RADIOLOGY  I have independently interpreted the diagnostic imaging associated with this visit and am waiting the final reading from the radiologist.   My preliminary interpretation is as follows: Chest x-ray shows no acute cardiopulmonary process.      Radiologist interpretation:   DX-CHEST-PORTABLE (1 VIEW)   Final Result         No acute cardiac or pulmonary abnormality is identified.           COURSE & MEDICAL DECISION MAKING    ED COURSE:        INTERVENTIONS BY ME:  Medications - No data to display            CHEST PAIN:   HEART Score for Major Cardiac Events  HEART Score     History: Slightly suspicious  ECG: Normal  Age: 45-64  Risk Factors: 1-2 risk factors  Troponin: Less than or equal to normal limit    Heart Score: 2    Total Score   0-3 Points = Low Score, risk of MACE 0.9-1.7%.  4-6 Points = Moderate Score, risk of MACE 12-16.6%  7-10 Points = High Score, risk of MACE 50-65%     INITIAL ASSESSMENT, COURSE AND PLAN  Care Narrative: Patient presented for evaluation of generally feeling unwell, with associated chest pressure and sinus pressure as well as tooth pain.  Elevated blood pressure readings.  Upon my assessment patient is resting comfortably no acute distress.  Multiple differentials considered including but not limited to viral syndrome dehydration atypical ACS and pneumonia among many other  considerations.  EKG was obtained was without evidence of ectopy arrhythmia or ischemia.  A troponin is negative chest pains been ongoing for over 12 hours lowering concern for ACS.  She has no hypoxia no tachypnea, no tachycardia doubt underlying pulmonary embolism.  She is afebrile here with a normal white count, no urinary symptoms, doubt sepsis at this point.  Unclear exact etiology of the patient's feeling unwell as well as her discomfort though given reassuring workup I do believe she is appropriate for outpatient management she is instructed to return with any new or worsening symptoms and to follow-up with her primary she is discharged in stable condition           ADDITIONAL PROBLEM LIST      Decision tools and prescription drugs considered including, but not limited to: Respiratory.    FINAL DIAGNOSIS  1. Chest pain, unspecified type    2. Sinus pressure    3. Feeling unwell             Electronically signed by: Ronni Claudio DO ,5:56 PM 07/07/25         [1]   Past Medical History:  Diagnosis Date    Abdominal aortic atherosclerosis (HCC) 10/2021    Asthma     Borderline personality disorder (HCC) 05/2017    Depression     Hypertension     per patient, not on any medications   [2]   Past Surgical History:  Procedure Laterality Date    MA REPAIR OF NASAL SEPTUM N/A 12/15/2022    Procedure: SEPTOPLASTY, NOSE;  Surgeon: Jules Ann M.D.;  Location: SURGERY SAME DAY [facility];  Service: Ent    STEREOTACTIC COMPUTER ASSISTED PROCEDURE CRANIAL, EXTRADURAL Bilateral 12/15/2022    Procedure: STEREOTACTIC COMPUTER-ASSISTED (NAVIGATIONAL) PROCEDURE; CRANIAL, EXTRADURAL; BILATERAL ENDOSCOPIC TOTAL ETHMOIDECTOMY WITH TISSUE REMOVAL,  BILATERAL ENDOSCOPIC MAXILARY ANTROSTOMY WITH TISSUE REMOVAL;  Surgeon: Jules Ann M.D.;  Location: SURGERY SAME DAY [facility];  Service: Ent    ENDOSCOPY, PARANASAL SINUS, WITH TOTAL ETHMOIDECTOMY, SN Bilateral 12/15/2022    Procedure: ENDOSCOPY, PARANASAL SINUS;  Surgeon:  Jules Ann M.D.;  Location: SURGERY SAME DAY [facility];  Service: Ent    OTHER  09/2020    bilateral oophorectomy/salpingectomy    ABDOMINAL HYSTERECTOMY TOTAL      TUMOR EXCISION WITH BIOPSY      stomach - benign   [3]   No current facility-administered medications on file prior to encounter.     Current Outpatient Medications on File Prior to Encounter   Medication Sig Dispense Refill    NON SPECIFIED Blood pressure support supplement      Cannabinoids (THC FREE) 20 MG/ML Liquid Take  by mouth.      Multiple Vitamin (MULTI-VITAMIN DAILY PO) Take 1 Tablet by mouth every evening. Once day      Non Formulary Request Take 2 Tablets by mouth every evening.      albuterol 108 (90 Base) MCG/ACT Aero Soln inhalation aerosol INHALE 2 PUFFS BY MOUTH EVERY 6 HOURS AS NEEDED FOR SHORTNESS OF BREATH 8.5 g 3   [4]   Allergies  Allergen Reactions    Atorvastatin     Benadryl [Diphenhydramine]

## 2025-07-08 NOTE — ED NOTES
Pt discharged home with written and verbal instructions regarding f/u, activity, reasons to return to ED. Verbalized understanding, all questions addressed, ambulated out with a steady gait with all belongings.

## (undated) DEVICE — TUBING CLEARLINK DUO-VENT - C-FLO (48EA/CA)

## (undated) DEVICE — SUTURE 4-0 PLAIN GUT SC-1 ETHICON (36PK/BX)

## (undated) DEVICE — SET LEADWIRE 5 LEAD BEDSIDE DISPOSABLE ECG (1SET OF 5/EA)

## (undated) DEVICE — SUTURE GENERAL

## (undated) DEVICE — SYRINGE DISP. 60 CC LL - (30/BX, 12BX/CA)**WHEN THESE ARE GONE ORDER #500206**

## (undated) DEVICE — TRACKER ENT PATIENT

## (undated) DEVICE — DRESSING MASS EYE & EAR SIZE 2 (2EA/PK  50PK/BX  100EA/BX)

## (undated) DEVICE — SHEATH SHARP SITE 30 DEGREE ENDOSCRUB II (5EA/PK)

## (undated) DEVICE — BLADE STRAIGHT SINUS (5EA/PK)

## (undated) DEVICE — GOWN WARMING STANDARD FLEX - (30/CA)

## (undated) DEVICE — TUBING ENDOSCRUB II (5EA/PK)

## (undated) DEVICE — SODIUM CHL IRRIGATION 0.9% 1000ML (12EA/CA)

## (undated) DEVICE — SUCTION INSTRUMENT YANKAUER BULBOUS TIP W/O VENT (50EA/CA)

## (undated) DEVICE — TRACKER ENT INSTRUMENT

## (undated) DEVICE — LACTATED RINGERS INJ 1000 ML - (14EA/CA 60CA/PF)

## (undated) DEVICE — SENSOR OXIMETER ADULT SPO2 RD SET (20EA/BX)

## (undated) DEVICE — ANTI-FOG SOLUTION - 60BTL/CA

## (undated) DEVICE — GLOVE SZ 6 BIOGEL PI MICRO - PF LF (50PR/BX 4BX/CA)

## (undated) DEVICE — NEEDLE SPINAL NON-SAFETY 25GA X 3 (25EA/BX)"

## (undated) DEVICE — CANISTER SUCTION RIGID RED 1500CC (40EA/CA)

## (undated) DEVICE — GLOVE BIOGEL SZ 7.5 SURGICAL PF LTX - (50PR/BX 4BX/CA)

## (undated) DEVICE — WATER IRRIGATION STERILE 1000ML (12EA/CA)

## (undated) DEVICE — SOD. CHL. INJ. 0.9% 250 ML - (36/CA 50CA/PF)

## (undated) DEVICE — PATTIES SURG X-RAYCOTTONOID - 1/2 X 3 IN (200/CA)

## (undated) DEVICE — KIT  I.V. START (100EA/CA)

## (undated) DEVICE — TUBE CONNECTING SUCTION - CLEAR PLASTIC STERILE 72 IN (50EA/CA)

## (undated) DEVICE — SPECIMEN PLASTIC CONVERTOR - (10/CA)

## (undated) DEVICE — CANISTER SUCTION 3000ML MECHANICAL FILTER AUTO SHUTOFF MEDI-VAC NONSTERILE LF DISP  (40EA/CA)

## (undated) DEVICE — WIPE SURGICAL INSTRUMENT VISIWIPE 2-7/8IN X 2-7/8IN (20EA/PK)

## (undated) DEVICE — MASK OXYGEN VNYL ADLT MED CONC WITH 7 FOOT TUBING  - (50EA/CA)

## (undated) DEVICE — GLOVE BIOGEL INDICATOR SZ 7.5 SURGICAL PF LTX - (50PR/BX 4BX/CA)

## (undated) DEVICE — TOWEL STOP TIMEOUT SAFETY FLAG (40EA/CA)

## (undated) DEVICE — SLEEVE VASO CALF MED - (10PR/CA)

## (undated) DEVICE — SUTURE 2-0 PROLENE SH D/A (36PK/BX)

## (undated) DEVICE — TOWELS CLOTH SURGICAL - (4/PK 20PK/CA)

## (undated) DEVICE — MASK AIRWAY FLEXIBLE SINGLE-USE SIZE 3 CHILDREN (10EA/BX)

## (undated) DEVICE — SPLINT NASAL DOYLE AIRWAY (2EA/ST)

## (undated) DEVICE — PACK ENT OR - (2EA/CA)

## (undated) DEVICE — CANNULA W/ SUPPLY TUBING O2 - (50/CA)

## (undated) DEVICE — SPONGE GAUZESTER. 2X2 4-PL - (2/PK 50PK/BX 30BX/CS)